# Patient Record
Sex: MALE | Race: WHITE | Employment: UNEMPLOYED | URBAN - METROPOLITAN AREA
[De-identification: names, ages, dates, MRNs, and addresses within clinical notes are randomized per-mention and may not be internally consistent; named-entity substitution may affect disease eponyms.]

---

## 2017-03-20 DIAGNOSIS — F90.0 ATTENTION DEFICIT HYPERACTIVITY DISORDER (ADHD), PREDOMINANTLY INATTENTIVE TYPE: ICD-10-CM

## 2017-03-21 RX ORDER — DEXTROAMPHETAMINE SACCHARATE, AMPHETAMINE ASPARTATE MONOHYDRATE, DEXTROAMPHETAMINE SULFATE AND AMPHETAMINE SULFATE 7.5; 7.5; 7.5; 7.5 MG/1; MG/1; MG/1; MG/1
30 CAPSULE, EXTENDED RELEASE ORAL
Qty: 90 CAP | Refills: 0 | Status: SHIPPED | OUTPATIENT
Start: 2017-03-21 | End: 2017-06-05 | Stop reason: SDUPTHER

## 2017-03-22 ENCOUNTER — HOSPITAL ENCOUNTER (OUTPATIENT)
Dept: GENERAL RADIOLOGY | Age: 22
Discharge: HOME | End: 2017-03-22
Attending: ORTHOPAEDIC SURGERY
Payer: COMMERCIAL

## 2017-03-22 ENCOUNTER — HOSPITAL ENCOUNTER (OUTPATIENT)
Dept: MRI IMAGING | Age: 22
Discharge: HOME | End: 2017-03-22
Attending: ORTHOPAEDIC SURGERY
Payer: COMMERCIAL

## 2017-03-22 VITALS — WEIGHT: 150 LBS | BODY MASS INDEX: 22.15 KG/M2

## 2017-03-22 DIAGNOSIS — S62.025A NONDISPLACED FRACTURE OF MIDDLE THIRD OF NAVICULAR BONE OF LEFT WRIST: ICD-10-CM

## 2017-03-22 PROCEDURE — 74011250636 HC RX REV CODE- 250/636: Performed by: ORTHOPAEDIC SURGERY

## 2017-03-22 PROCEDURE — A9585 GADOBUTROL INJECTION: HCPCS | Performed by: ORTHOPAEDIC SURGERY

## 2017-03-22 PROCEDURE — 25246 INJECTION FOR WRIST X-RAY: CPT

## 2017-03-22 PROCEDURE — 73222 MRI JOINT UPR EXTREM W/DYE: CPT

## 2017-03-22 PROCEDURE — 74011000250 HC RX REV CODE- 250: Performed by: RADIOLOGY

## 2017-03-22 PROCEDURE — 74011636320 HC RX REV CODE- 636/320: Performed by: RADIOLOGY

## 2017-03-22 RX ORDER — LIDOCAINE HYDROCHLORIDE 10 MG/ML
1 INJECTION INFILTRATION; PERINEURAL
Status: COMPLETED | OUTPATIENT
Start: 2017-03-22 | End: 2017-03-22

## 2017-03-22 RX ORDER — SODIUM CHLORIDE 9 MG/ML
3 INJECTION INTRAMUSCULAR; INTRAVENOUS; SUBCUTANEOUS
Status: COMPLETED | OUTPATIENT
Start: 2017-03-22 | End: 2017-03-22

## 2017-03-22 RX ADMIN — SODIUM BICARBONATE 5 ML: 0.2 INJECTION, SOLUTION INTRAVENOUS at 10:34

## 2017-03-22 RX ADMIN — LIDOCAINE HYDROCHLORIDE 1 ML: 10 INJECTION, SOLUTION INFILTRATION; PERINEURAL at 10:34

## 2017-03-22 RX ADMIN — SODIUM CHLORIDE 3 ML: 9 INJECTION INTRAMUSCULAR; INTRAVENOUS; SUBCUTANEOUS at 10:35

## 2017-03-22 RX ADMIN — IOHEXOL 1 ML: 300 INJECTION, SOLUTION INTRAVENOUS at 10:33

## 2017-03-22 RX ADMIN — GADOBUTROL 2 ML: 604.72 INJECTION INTRAVENOUS at 11:11

## 2017-06-05 ENCOUNTER — OFFICE VISIT (OUTPATIENT)
Dept: INTERNAL MEDICINE CLINIC | Age: 22
End: 2017-06-05

## 2017-06-05 VITALS
DIASTOLIC BLOOD PRESSURE: 68 MMHG | BODY MASS INDEX: 23.73 KG/M2 | HEART RATE: 69 BPM | HEIGHT: 69 IN | OXYGEN SATURATION: 97 % | SYSTOLIC BLOOD PRESSURE: 120 MMHG | TEMPERATURE: 98.7 F | WEIGHT: 160.2 LBS | RESPIRATION RATE: 12 BRPM

## 2017-06-05 DIAGNOSIS — F90.0 ATTENTION DEFICIT HYPERACTIVITY DISORDER (ADHD), PREDOMINANTLY INATTENTIVE TYPE: ICD-10-CM

## 2017-06-05 RX ORDER — DEXTROAMPHETAMINE SACCHARATE, AMPHETAMINE ASPARTATE MONOHYDRATE, DEXTROAMPHETAMINE SULFATE AND AMPHETAMINE SULFATE 7.5; 7.5; 7.5; 7.5 MG/1; MG/1; MG/1; MG/1
30 CAPSULE, EXTENDED RELEASE ORAL
Qty: 90 CAP | Refills: 0 | Status: SHIPPED | OUTPATIENT
Start: 2017-06-05 | End: 2017-09-27 | Stop reason: SDUPTHER

## 2017-06-05 NOTE — PROGRESS NOTES
Valeri Clayton 1995, is a 23 y.o. male, who is seen today for reevaluation of ADD. Several weeks ago he tore a ligament in his left wrist and has had a cast since then but expects to have a shorter cast within a couple of days and have the cast off in a few more weeks. He is doing well with Adderall, he is up about 6 pounds from his last visit which may be partially because of the cast.  He does not think he is eating any differently. He sleeps well at night and is able to concentrate with Adderall use. Past Medical History:   Diagnosis Date    ADD (attention deficit hyperactivity disorder, inattentive type)     Heart murmur     as child per pt     Current Outpatient Prescriptions   Medication Sig Dispense Refill    amphetamine-dextroamphetamine XR (ADDERALL XR) 30 mg XR capsule Take 1 Cap (30 mg total) by mouth every morningEarliest Fill Date: 6/5/17. Max Daily Amount: 30 mg 90 Cap 0     Visit Vitals    /68    Pulse 69    Temp 98.7 °F (37.1 °C) (Oral)    Resp 12    Ht 5' 9\" (1.753 m)    Wt 160 lb 3.2 oz (72.7 kg)    SpO2 97%    BMI 23.66 kg/m2     Lungs are clear to percussion. Good breath sounds with no wheezing or crackles. Heart reveals a regular rhythm with normal S1 and S2 no murmur gallop click or rub. Abdomen is soft and nontender with no hepatosplenomegaly or masses. Extremities reveal no clubbing cyanosis or edema. Assessment: #1. He now has a cast for torn ligament, he will follow-up with his orthopedist.  #2.  ADD is doing well, weight is up just a few pounds partly because of his cast.  He will continue healthy diet continue Adderall 30 mg daily on days he needs to concentrate. I have explained to him he does not need to use it every day, just the days he has to concentrate such as work or study. I talked to him about proper use and improper use of the medicine again today.     Follow-up 6 months    This visit lasted 25 minutes, greater than 50% of the time spent counseling as above. Edgar York MD FACP    Please note: This document has been produced using voice recognition software. Unrecognized errors in transcription may be present.

## 2017-09-27 DIAGNOSIS — F90.0 ATTENTION DEFICIT HYPERACTIVITY DISORDER (ADHD), PREDOMINANTLY INATTENTIVE TYPE: ICD-10-CM

## 2017-09-27 RX ORDER — DEXTROAMPHETAMINE SACCHARATE, AMPHETAMINE ASPARTATE MONOHYDRATE, DEXTROAMPHETAMINE SULFATE AND AMPHETAMINE SULFATE 7.5; 7.5; 7.5; 7.5 MG/1; MG/1; MG/1; MG/1
30 CAPSULE, EXTENDED RELEASE ORAL
Qty: 90 CAP | Refills: 0 | Status: SHIPPED | OUTPATIENT
Start: 2017-09-27 | End: 2017-11-17 | Stop reason: SDUPTHER

## 2017-11-15 ENCOUNTER — TELEPHONE (OUTPATIENT)
Dept: INTERNAL MEDICINE CLINIC | Age: 22
End: 2017-11-15

## 2017-11-15 NOTE — TELEPHONE ENCOUNTER
Patient stating his rx for Adderall was lost in the mail. He is currently in [place]. He finally got it but the pharmacy wont fill it because they said it has . He wants to know if you will write another one and he will have his Mother  the script for him.

## 2017-11-16 NOTE — TELEPHONE ENCOUNTER
Pt calling again asking if we can write a new rx for him.  He never filled the one in Sept. Says his mom mailed it to him but it was lost in the mail

## 2017-11-17 DIAGNOSIS — F90.0 ATTENTION DEFICIT HYPERACTIVITY DISORDER (ADHD), PREDOMINANTLY INATTENTIVE TYPE: ICD-10-CM

## 2017-11-17 RX ORDER — DEXTROAMPHETAMINE SACCHARATE, AMPHETAMINE ASPARTATE MONOHYDRATE, DEXTROAMPHETAMINE SULFATE AND AMPHETAMINE SULFATE 7.5; 7.5; 7.5; 7.5 MG/1; MG/1; MG/1; MG/1
30 CAPSULE, EXTENDED RELEASE ORAL
Qty: 90 CAP | Refills: 0 | Status: SHIPPED | OUTPATIENT
Start: 2017-11-17 | End: 2018-03-14 | Stop reason: SDUPTHER

## 2017-11-17 NOTE — TELEPHONE ENCOUNTER
Patient calling again asking for a rx for his Adderall   Patient asking for a script for today   Pls Advise

## 2018-03-06 DIAGNOSIS — F90.0 ATTENTION DEFICIT HYPERACTIVITY DISORDER (ADHD), PREDOMINANTLY INATTENTIVE TYPE: ICD-10-CM

## 2018-03-06 RX ORDER — DEXTROAMPHETAMINE SACCHARATE, AMPHETAMINE ASPARTATE MONOHYDRATE, DEXTROAMPHETAMINE SULFATE AND AMPHETAMINE SULFATE 7.5; 7.5; 7.5; 7.5 MG/1; MG/1; MG/1; MG/1
30 CAPSULE, EXTENDED RELEASE ORAL
Qty: 90 CAP | Refills: 0 | OUTPATIENT
Start: 2018-03-06

## 2018-03-06 NOTE — TELEPHONE ENCOUNTER
Please contact the patient for scheduling per refusal reason below. Thanks.      Refused by: Mariela Nagel MD  Refusal reason: Appt required, please call patient

## 2018-03-06 NOTE — TELEPHONE ENCOUNTER
[facility]/2017 for a 90 d/s. There appears to be no inconsistencies in regards to the prescribing of this medication. Last Visit: 06/05/2017 with MD Ivett Obregon    Next Appointment: noted to f/u in 6 months   Previous Refill Encounters: 11/17/2017 per MD Ivett Obregon #90     Requested Prescriptions     Pending Prescriptions Disp Refills    amphetamine-dextroamphetamine XR (ADDERALL XR) 30 mg XR capsule 90 Cap 0     Sig: Take 1 Cap (30 mg total) by mouth every morningEarliest Fill Date: 3/6/18.   Max Daily Amount: 30 mg

## 2018-03-14 ENCOUNTER — OFFICE VISIT (OUTPATIENT)
Dept: INTERNAL MEDICINE CLINIC | Age: 23
End: 2018-03-14

## 2018-03-14 VITALS
BODY MASS INDEX: 21.77 KG/M2 | WEIGHT: 147 LBS | RESPIRATION RATE: 12 BRPM | HEIGHT: 69 IN | TEMPERATURE: 98.9 F | OXYGEN SATURATION: 98 % | HEART RATE: 65 BPM | SYSTOLIC BLOOD PRESSURE: 120 MMHG | DIASTOLIC BLOOD PRESSURE: 60 MMHG

## 2018-03-14 DIAGNOSIS — F90.0 ATTENTION DEFICIT HYPERACTIVITY DISORDER (ADHD), PREDOMINANTLY INATTENTIVE TYPE: ICD-10-CM

## 2018-03-14 RX ORDER — DEXTROAMPHETAMINE SACCHARATE, AMPHETAMINE ASPARTATE MONOHYDRATE, DEXTROAMPHETAMINE SULFATE AND AMPHETAMINE SULFATE 7.5; 7.5; 7.5; 7.5 MG/1; MG/1; MG/1; MG/1
30 CAPSULE, EXTENDED RELEASE ORAL
Qty: 90 CAP | Refills: 0 | Status: SHIPPED | OUTPATIENT
Start: 2018-03-14 | End: 2018-07-02 | Stop reason: SDUPTHER

## 2018-03-14 NOTE — PROGRESS NOTES
Robert Araiza 1995, is a 23 y.o. male, who is seen today for reevaluation of ADD. He feels well. He has been using Adderall 30 mg daily for quite a while and that works well for him. Without it he can concentrate nearly as well. He had gained quite a bit of weight last summer and is down 13 pounds from June. No longer drinks beer, he says if he has even 1 beer he feels hung over the next day so he stopped drinking altogether. Past Medical History:   Diagnosis Date    ADD (attention deficit hyperactivity disorder, inattentive type)     Heart murmur     as child per pt     Current Outpatient Prescriptions   Medication Sig Dispense Refill    amphetamine-dextroamphetamine XR (ADDERALL XR) 30 mg XR capsule Take 1 Cap (30 mg total) by mouth every morningEarliest Fill Date: 3/14/18. Max Daily Amount: 30 mg 90 Cap 0     Visit Vitals    /60    Pulse 65    Temp 98.9 °F (37.2 °C) (Oral)    Resp 12    Ht 5' 9\" (1.753 m)    Wt 147 lb (66.7 kg)    SpO2 98%    BMI 21.71 kg/m2     Lungs are clear to percussion. Good breath sounds with no wheezing or crackles. Heart reveals a regular rhythm with normal S1 and S2 no murmur gallop click or rub. Neck reveals no adenopathy or thyromegaly. Assessment: ADD doing well. He will continue Adderall XL 30 mg daily which has worked out best for him. Follow-up 6 months or sooner if needed    Edgar Luna MD FACP    Please note: This document has been produced using voice recognition software. Unrecognized errors in transcription may be present.

## 2018-03-15 ENCOUNTER — TELEPHONE (OUTPATIENT)
Dept: INTERNAL MEDICINE CLINIC | Age: 23
End: 2018-03-15

## 2018-03-15 NOTE — TELEPHONE ENCOUNTER
Please contact the patient's plan to initiate a prior authorization for Adderall at [phone].  Patient ID # is [id number]

## 2018-07-02 DIAGNOSIS — F90.0 ATTENTION DEFICIT HYPERACTIVITY DISORDER (ADHD), PREDOMINANTLY INATTENTIVE TYPE: ICD-10-CM

## 2018-07-02 RX ORDER — DEXTROAMPHETAMINE SACCHARATE, AMPHETAMINE ASPARTATE MONOHYDRATE, DEXTROAMPHETAMINE SULFATE AND AMPHETAMINE SULFATE 7.5; 7.5; 7.5; 7.5 MG/1; MG/1; MG/1; MG/1
30 CAPSULE, EXTENDED RELEASE ORAL
Qty: 90 CAP | Refills: 0 | Status: SHIPPED | OUTPATIENT
Start: 2018-07-02 | End: 2018-12-10 | Stop reason: SDUPTHER

## 2018-07-02 NOTE — TELEPHONE ENCOUNTER
[facility] as 03/16/2018. There appears to be no inconsistencies in regards to the prescribing of this medication. Last Visit: 03/14/2018 with MD Lisette Luu    Next Appointment: noted to f/u in 6 months   Previous Refill Encounters: 03/14/2018 per MD Lisette Luu #90     Requested Prescriptions     Pending Prescriptions Disp Refills    amphetamine-dextroamphetamine XR (ADDERALL XR) 30 mg XR capsule 90 Cap 0     Sig: Take 1 Cap (30 mg total) by mouth every morningEarliest Fill Date: 7/2/18.   Max Daily Amount: 30 mg

## 2018-12-10 ENCOUNTER — OFFICE VISIT (OUTPATIENT)
Dept: INTERNAL MEDICINE CLINIC | Age: 23
End: 2018-12-10

## 2018-12-10 VITALS
WEIGHT: 150 LBS | BODY MASS INDEX: 22.22 KG/M2 | HEIGHT: 69 IN | RESPIRATION RATE: 12 BRPM | SYSTOLIC BLOOD PRESSURE: 110 MMHG | DIASTOLIC BLOOD PRESSURE: 68 MMHG | HEART RATE: 74 BPM | TEMPERATURE: 98.5 F | OXYGEN SATURATION: 98 %

## 2018-12-10 DIAGNOSIS — F90.0 ATTENTION DEFICIT HYPERACTIVITY DISORDER (ADHD), PREDOMINANTLY INATTENTIVE TYPE: ICD-10-CM

## 2018-12-10 RX ORDER — DEXTROAMPHETAMINE SACCHARATE, AMPHETAMINE ASPARTATE MONOHYDRATE, DEXTROAMPHETAMINE SULFATE AND AMPHETAMINE SULFATE 7.5; 7.5; 7.5; 7.5 MG/1; MG/1; MG/1; MG/1
30 CAPSULE, EXTENDED RELEASE ORAL
Qty: 90 CAP | Refills: 0 | Status: SHIPPED | OUTPATIENT
Start: 2018-12-10 | End: 2019-03-25 | Stop reason: SDUPTHER

## 2018-12-10 NOTE — PROGRESS NOTES
Ellen Shah 1995, is a 20 y.o. male, who is seen today for reevaluation of ADD. He continues to function well at work, concentrates very well with the help of Adderall and only uses it on days he needs to concentrate. He sleeps well at night and his weight is only 3 pounds different than it was when I last saw him in March. No other new complaints. Past Medical History:  
Diagnosis Date  ADD (attention deficit hyperactivity disorder, inattentive type)  Heart murmur   
 as child per pt Current Outpatient Medications Medication Sig Dispense Refill  amphetamine-dextroamphetamine XR (ADDERALL XR) 30 mg XR capsule Take 1 Cap (30 mg total) by mouth every morningEarliest Fill Date: 12/10/18. Max Daily Amount: 30 mg 90 Cap 0 Visit Vitals /68 Pulse 74 Temp 98.5 °F (36.9 °C) (Oral) Resp 12 Ht 5' 9\" (1.753 m) Wt 150 lb (68 kg) SpO2 98% BMI 22.15 kg/m² Neck reveals no adenopathy or thyromegaly. No JVD or HJR. Lungs are clear to percussion. Good breath sounds with no wheezing or crackles. Heart reveals a regular rhythm with normal S1 and S2 no murmur gallop click or rub. Apical impulse is not palpable. Abdomen is soft and nontender with no hepatosplenomegaly or masses and no bruits. Extremities reveal no clubbing cyanosis or edema. Pulses are 2+. Gait is normal.  Affect is normal.  Skin reveals numerous large tattoos of the arms and trunk. Assessment: ADD, functioning well with no apparent side effects. He will continue Adderall XR 30 mg daily on days he needs to concentrate and he will not use it when he does not need to concentrate. He will call me if side effects develop such as substantial weight change, tremulousness or other side effects. I have explained to him that he needs to come in every 6 months while on this schedule II drug, not 8 or 9 months. He agrees to this.   #2.  History of childhood murmur, I have not heard a murmur, I will take this off his problem list. 
 
Follow-up 6 months or sooner if needed, but definitely not beyond 6 months if he is going to stay on Adderall. This visit lasted 25 minutes, greater than 50% of the time was spent counseling as above. Edgar Purcell, [address] Please note: This document has been produced using voice recognition software. Unrecognized errors in transcription may be present.

## 2019-03-25 DIAGNOSIS — F90.0 ATTENTION DEFICIT HYPERACTIVITY DISORDER (ADHD), PREDOMINANTLY INATTENTIVE TYPE: ICD-10-CM

## 2019-03-25 RX ORDER — DEXTROAMPHETAMINE SACCHARATE, AMPHETAMINE ASPARTATE MONOHYDRATE, DEXTROAMPHETAMINE SULFATE AND AMPHETAMINE SULFATE 7.5; 7.5; 7.5; 7.5 MG/1; MG/1; MG/1; MG/1
30 CAPSULE, EXTENDED RELEASE ORAL
Qty: 90 CAP | Refills: 0 | Status: SHIPPED | OUTPATIENT
Start: 2019-03-25 | End: 2019-09-10 | Stop reason: SDUPTHER

## 2019-03-25 NOTE — TELEPHONE ENCOUNTER
[facility]der[facility] XR as 8-3-18 for a 90 d/s. No info on  regarding the December 2018 Rx. Last Visit: 12-10-18 with MD Rukhsana Thompson  Next Appointment: advised to return in 6 months  Previous Refill Encounter(s): 12-10-18 #90    Requested Prescriptions     Pending Prescriptions Disp Refills    amphetamine-dextroamphetamine XR (ADDERALL XR) 30 mg XR capsule 90 Cap 0     Sig: Take 1 Cap by mouth every morning. Max Daily Amount: 30 mg.

## 2019-04-12 ENCOUNTER — RECORD ABSTRACTING (OUTPATIENT)
Age: 24
End: 2019-04-12

## 2019-04-12 DIAGNOSIS — Z78.9 OTHER SPECIFIED HEALTH STATUS: ICD-10-CM

## 2019-04-12 DIAGNOSIS — M23.91 UNSPECIFIED INTERNAL DERANGEMENT OF RIGHT KNEE: ICD-10-CM

## 2019-04-12 PROBLEM — Z00.00 ENCOUNTER FOR PREVENTIVE HEALTH EXAMINATION: Status: ACTIVE | Noted: 2019-04-12

## 2019-04-12 RX ORDER — NAPROXEN 500 MG/1
500 TABLET ORAL
Refills: 0 | Status: ACTIVE | COMMUNITY

## 2019-04-17 ENCOUNTER — APPOINTMENT (OUTPATIENT)
Dept: ORTHOPEDIC SURGERY | Facility: CLINIC | Age: 24
End: 2019-04-17

## 2019-04-22 ENCOUNTER — TELEPHONE (OUTPATIENT)
Dept: INTERNAL MEDICINE CLINIC | Age: 24
End: 2019-04-22

## 2019-09-10 ENCOUNTER — TELEPHONE (OUTPATIENT)
Dept: INTERNAL MEDICINE CLINIC | Age: 24
End: 2019-09-10

## 2019-09-10 ENCOUNTER — OFFICE VISIT (OUTPATIENT)
Dept: INTERNAL MEDICINE CLINIC | Age: 24
End: 2019-09-10

## 2019-09-10 VITALS
TEMPERATURE: 98.7 F | OXYGEN SATURATION: 98 % | WEIGHT: 155 LBS | BODY MASS INDEX: 22.96 KG/M2 | RESPIRATION RATE: 12 BRPM | SYSTOLIC BLOOD PRESSURE: 126 MMHG | DIASTOLIC BLOOD PRESSURE: 70 MMHG | HEART RATE: 72 BPM | HEIGHT: 69 IN

## 2019-09-10 DIAGNOSIS — F90.0 ATTENTION DEFICIT HYPERACTIVITY DISORDER (ADHD), PREDOMINANTLY INATTENTIVE TYPE: ICD-10-CM

## 2019-09-10 RX ORDER — DEXTROAMPHETAMINE SACCHARATE, AMPHETAMINE ASPARTATE MONOHYDRATE, DEXTROAMPHETAMINE SULFATE AND AMPHETAMINE SULFATE 7.5; 7.5; 7.5; 7.5 MG/1; MG/1; MG/1; MG/1
30 CAPSULE, EXTENDED RELEASE ORAL
Qty: 90 CAP | Refills: 0 | Status: SHIPPED | OUTPATIENT
Start: 2019-09-10 | End: 2019-10-23 | Stop reason: SDUPTHER

## 2019-09-10 NOTE — PROGRESS NOTES
Brook Okeefe 1995, is a 30 y.o. male, who is seen today for reevaluation of ADD but also concerned about erectile dysfunction. He has been dating the same girl for about 3 years and then broke up but now when he has been out with a couple of other girls he has not been able to get an adequate erection. He thinks it may be just in his head. He is functioning well with Adderall. He is a little late with follow-up because he had an insurance issue but now is back with his insurance company. Past Medical History:   Diagnosis Date    ADD (attention deficit hyperactivity disorder, inattentive type)      Current Outpatient Medications   Medication Sig Dispense Refill    amphetamine-dextroamphetamine XR (ADDERALL XR) 30 mg XR capsule Take 1 Cap by mouth every morning. Max Daily Amount: 30 mg. 90 Cap 0     Visit Vitals  /70 (BP 1 Location: Left arm, BP Patient Position: Sitting)   Pulse 72   Temp 98.7 °F (37.1 °C) (Oral)   Resp 12   Ht 5' 9\" (1.753 m)   Wt 155 lb (70.3 kg)   SpO2 98%   BMI 22.89 kg/m²     Carotids are 2+ without bruits. No adenopathy or thyromegaly. Lungs are clear to percussion. Good breath sounds with no wheezing or crackles. Heart reveals a regular rhythm with normal S1 and S2 no murmur gallop click or rub. Abdomen is soft and nontender with no hepatosplenomegaly or masses and no bruits. Extremities reveal no clubbing cyanosis or edema. Pulses are 2+. Assessment: #1. ADD doing well, he will continue Adderall XR 30 mg daily. #2.  Recent problems with erectile dysfunction with change in sexual partners. Nothing obvious from a physical standpoint, this should clear up on its own in this 73-year-old healthy male. I would send him to a urologist if it does not get better. Follow-up in 6 months    Edgar Crouch MD FACP    Please note: This document has been produced using voice recognition software. Unrecognized errors in transcription may be present.

## 2019-09-10 NOTE — TELEPHONE ENCOUNTER
Pt calling, says he was here today and got an rx for adderall 90 days. Says his pharmacy can only fill for 30 days per his insurance company. He just wanted RM to know so when he requests again in 30 days he won't be told too soon.

## 2019-10-23 DIAGNOSIS — F90.0 ATTENTION DEFICIT HYPERACTIVITY DISORDER (ADHD), PREDOMINANTLY INATTENTIVE TYPE: ICD-10-CM

## 2019-10-23 RX ORDER — DEXTROAMPHETAMINE SACCHARATE, AMPHETAMINE ASPARTATE MONOHYDRATE, DEXTROAMPHETAMINE SULFATE AND AMPHETAMINE SULFATE 7.5; 7.5; 7.5; 7.5 MG/1; MG/1; MG/1; MG/1
30 CAPSULE, EXTENDED RELEASE ORAL
Qty: 30 CAP | Refills: 0 | Status: SHIPPED | OUTPATIENT
Start: 2019-10-23 | End: 2019-11-12 | Stop reason: SDUPTHER

## 2019-10-23 NOTE — TELEPHONE ENCOUNTER
Last Visit: 9/10/19 with MD Rebeca Schmitz  Next Appointment: return in 6 months  Previous Refill Encounter(s): 9/10/19 #90 (pharmacy only filled #30 due to insurance limits)    Requested Prescriptions     Pending Prescriptions Disp Refills    amphetamine-dextroamphetamine XR (ADDERALL XR) 30 mg XR capsule 30 Cap 0     Sig: Take 1 Cap by mouth every morning. Max Daily Amount: 30 mg.

## 2019-11-12 ENCOUNTER — OFFICE VISIT (OUTPATIENT)
Dept: INTERNAL MEDICINE CLINIC | Age: 24
End: 2019-11-12

## 2019-11-12 VITALS
WEIGHT: 155.6 LBS | HEART RATE: 74 BPM | OXYGEN SATURATION: 98 % | BODY MASS INDEX: 23.05 KG/M2 | TEMPERATURE: 98.9 F | SYSTOLIC BLOOD PRESSURE: 118 MMHG | HEIGHT: 69 IN | DIASTOLIC BLOOD PRESSURE: 80 MMHG | RESPIRATION RATE: 12 BRPM

## 2019-11-12 DIAGNOSIS — N52.8 OTHER MALE ERECTILE DYSFUNCTION: Primary | ICD-10-CM

## 2019-11-12 DIAGNOSIS — F90.0 ATTENTION DEFICIT HYPERACTIVITY DISORDER (ADHD), PREDOMINANTLY INATTENTIVE TYPE: ICD-10-CM

## 2019-11-12 RX ORDER — DEXTROAMPHETAMINE SACCHARATE, AMPHETAMINE ASPARTATE MONOHYDRATE, DEXTROAMPHETAMINE SULFATE AND AMPHETAMINE SULFATE 7.5; 7.5; 7.5; 7.5 MG/1; MG/1; MG/1; MG/1
30 CAPSULE, EXTENDED RELEASE ORAL
Qty: 30 CAP | Refills: 0 | Status: SHIPPED | OUTPATIENT
Start: 2019-11-12 | End: 2020-03-24 | Stop reason: SDUPTHER

## 2019-11-12 RX ORDER — SILDENAFIL 50 MG/1
TABLET, FILM COATED ORAL
Qty: 5 TAB | Refills: 1 | Status: SHIPPED | OUTPATIENT
Start: 2019-11-12

## 2019-11-12 RX ORDER — SILDENAFIL 50 MG/1
TABLET, FILM COATED ORAL
Qty: 5 TAB | Refills: 1 | Status: SHIPPED | OUTPATIENT
Start: 2019-11-12 | End: 2019-11-12 | Stop reason: SDUPTHER

## 2019-11-12 NOTE — PROGRESS NOTES
Rajni Boykin 1995, is a 30 y.o. male, who is seen today for concerns about erectile dysfunction and also tingling in his right hand periodically. For about 3 months he has had problems obtaining an adequate erection and an erection the last long enough for intercourse. He had a regular relationship until a little over 3 months ago and no problems during that time. He has dated a number of lady since then and that is when the problem developed. He also notes that he has had some tingling intermittently if he strains his right wrist, previous injury on that side. He has ADD and does need a refill on Adderall, he uses that appropriately, 30 mg a day and it helps him greatly. He has not noticed any side effects from that medicine, sleeps pretty well at night etc.    Past Medical History:   Diagnosis Date    ADD (attention deficit hyperactivity disorder, inattentive type)      Current Outpatient Medications   Medication Sig Dispense Refill    amphetamine-dextroamphetamine XR (ADDERALL XR) 30 mg XR capsule Take 1 Cap by mouth every morning. Max Daily Amount: 30 mg. 30 Cap 0     Visit Vitals  /80 (BP 1 Location: Left arm, BP Patient Position: Sitting)   Pulse 74   Temp 98.9 °F (37.2 °C)   Resp 12   Ht 5' 9\" (1.753 m)   Wt 155 lb 9.6 oz (70.6 kg)   SpO2 98%   BMI 22.98 kg/m²     Excellent  strength bilaterally. Radial arteries are 2+ bilaterally. Tinel sign is absent on the right side. He is circumcised and penis appears normal, normal pubic hair pattern, normal feeling testicles consistency and size. Assessment: #1. Erectile dysfunction almost certainly psychogenic. We will check total testosterone for him today but I anticipate this will be normal since he has normal feeling testicles and hair growth pattern.   Regardless of the results we will try him on sildenafil 50 mg at least 1 hour before intercourse for up to 5 doses and then dissipate that if he is successful with intercourse with the sildenafil probably thereafter he will be successful. #2. Intermittent tingling in the right hand from previous injury but no evidence of carpal tunnel syndrome, no symptoms at night. #3.  ADD has been doing quite well on Adderall, I do not think that has anything to do with any of his other symptoms. He will continue Adderall at 30 mg daily. Follow-up as previously planned for follow-up of ADD. Edgar Davies MD FACP    Please note: This document has been produced using voice recognition software. Unrecognized errors in transcription may be present. This visit lasted 25 minutes, greater than 50% of the time spent counseling. Edgar Davies MD FACP    Please note: This document has been produced using voice recognition software. Unrecognized errors in transcription may be present.

## 2019-11-13 ENCOUNTER — TELEPHONE (OUTPATIENT)
Dept: INTERNAL MEDICINE CLINIC | Age: 24
End: 2019-11-13

## 2019-11-13 ENCOUNTER — DOCUMENTATION ONLY (OUTPATIENT)
Dept: INTERNAL MEDICINE CLINIC | Age: 24
End: 2019-11-13

## 2019-11-13 DIAGNOSIS — Z51.81 MEDICATION MONITORING ENCOUNTER: Primary | ICD-10-CM

## 2019-11-13 DIAGNOSIS — Z51.81 MEDICATION MONITORING ENCOUNTER: ICD-10-CM

## 2019-11-13 LAB — TESTOST SERPL-MCNC: 404 NG/DL (ref 264–916)

## 2019-11-13 NOTE — TELEPHONE ENCOUNTER
[place] Page: Raudel CARPENTER Case ID: [id number] - Rx #: [id number]   Outcome Approvedtoday   Questionnaire submitted. PA Case [id number]   Status: Approved. Authorization and Notifications Completed. DrugAmphetamine-Dextroamphet ER 30MG er capsules      Patient notified, that he must provide a UDS for future approvals, or refills.

## 2019-11-13 NOTE — TELEPHONE ENCOUNTER
----- Message from Zoe Grimm MD sent at 11/13/2019  8:58 AM EST -----  Please notify the patient that his testosterone level is normal

## 2019-11-13 NOTE — TELEPHONE ENCOUNTER
Pt checking on prior auth for adderall.  Says his pharmacy says they sent request.    Wants call today with update please

## 2019-11-16 LAB
AMPHETAMINES UR QL SCN: NEGATIVE NG/ML
BARBITURATES UR QL SCN: NEGATIVE NG/ML
BENZODIAZ UR QL SCN: NEGATIVE NG/ML
BZE UR QL SCN: NEGATIVE NG/ML
CANNABINOIDS UR QL SCN: NEGATIVE NG/ML
CREAT UR-MCNC: 10.8 MG/DL (ref 20–300)
FENTANYL+NORFENTANYL UR QL SCN: NEGATIVE PG/ML
MEPERIDINE UR QL: NEGATIVE NG/ML
METHADONE UR QL SCN: NEGATIVE NG/ML
OPIATES UR QL SCN: NEGATIVE NG/ML
OXYCODONE+OXYMORPHONE UR QL SCN: NEGATIVE NG/ML
PCP UR QL: NEGATIVE NG/ML
PH UR: 6.9 [PH] (ref 4.5–8.9)
PLEASE NOTE:, 733157: ABNORMAL
PROPOXYPH UR QL SCN: NEGATIVE NG/ML
SP GR UR: 1
TRAMADOL UR QL SCN: NEGATIVE NG/ML

## 2019-11-22 ENCOUNTER — TELEPHONE (OUTPATIENT)
Dept: INTERNAL MEDICINE CLINIC | Age: 24
End: 2019-11-22

## 2019-11-22 NOTE — PROGRESS NOTES
Please notify the patient that his recent urine drug screen contained mostly water and very little urine. I am no longer able to prescribe any controlled substances to him, I recommend seeing a psychiatrist if he requires treatment for ADD.

## 2019-11-22 NOTE — TELEPHONE ENCOUNTER
----- Message from Nancy Barrios MD sent at 11/22/2019  1:11 PM EST -----  Please notify the patient that his recent urine drug screen contained mostly water and very little urine. I am no longer able to prescribe any controlled substances to him, I recommend seeing a psychiatrist if he requires treatment for ADD.

## 2019-11-22 NOTE — TELEPHONE ENCOUNTER
Pt stated this is impossible he did not put water in urine, pt stated he drunk a bunch of water prior to test so that he could urinate.

## 2020-01-28 ENCOUNTER — HOSPITAL ENCOUNTER (OUTPATIENT)
Dept: MRI IMAGING | Age: 25
Discharge: HOME | End: 2020-01-28
Attending: SURGERY
Payer: MEDICAID

## 2020-01-28 DIAGNOSIS — S63.519A: ICD-10-CM

## 2020-01-28 PROCEDURE — 73221 MRI JOINT UPR EXTREM W/O DYE: CPT

## 2020-02-11 DIAGNOSIS — F90.0 ATTENTION DEFICIT HYPERACTIVITY DISORDER (ADHD), PREDOMINANTLY INATTENTIVE TYPE: ICD-10-CM

## 2020-02-11 RX ORDER — DEXTROAMPHETAMINE SACCHARATE, AMPHETAMINE ASPARTATE MONOHYDRATE, DEXTROAMPHETAMINE SULFATE AND AMPHETAMINE SULFATE 7.5; 7.5; 7.5; 7.5 MG/1; MG/1; MG/1; MG/1
30 CAPSULE, EXTENDED RELEASE ORAL
Qty: 30 CAP | Refills: 0 | OUTPATIENT
Start: 2020-02-11

## 2020-02-11 NOTE — TELEPHONE ENCOUNTER
UDS done 11/14/19    Last Visit: 11/12/19 with MD Jhoana Stroud  Next Appointment: none  Previous Refill Encounter(s): 11/12/19 #30    Requested Prescriptions     Pending Prescriptions Disp Refills    amphetamine-dextroamphetamine XR (ADDERALL XR) 30 mg XR capsule 30 Cap 0     Sig: Take 1 Cap by mouth every morning. Max Daily Amount: 30 mg.

## 2020-02-12 NOTE — TELEPHONE ENCOUNTER
Pt advised RM will not refill due to last drug test.    He said he had asked nurse to request one additional drug test because he says he did not add any water to the test. He wants to know if RM will consider one additional test?

## 2020-03-24 ENCOUNTER — OFFICE VISIT (OUTPATIENT)
Dept: INTERNAL MEDICINE CLINIC | Age: 25
End: 2020-03-24

## 2020-03-24 VITALS
RESPIRATION RATE: 16 BRPM | HEIGHT: 69 IN | DIASTOLIC BLOOD PRESSURE: 66 MMHG | WEIGHT: 154.9 LBS | TEMPERATURE: 98.6 F | OXYGEN SATURATION: 100 % | SYSTOLIC BLOOD PRESSURE: 129 MMHG | BODY MASS INDEX: 22.94 KG/M2 | HEART RATE: 66 BPM

## 2020-03-24 DIAGNOSIS — Z00.00 PHYSICAL EXAM: Primary | ICD-10-CM

## 2020-03-24 DIAGNOSIS — F90.0 ATTENTION DEFICIT HYPERACTIVITY DISORDER (ADHD), PREDOMINANTLY INATTENTIVE TYPE: ICD-10-CM

## 2020-03-24 DIAGNOSIS — L03.116 CELLULITIS OF LEFT LOWER EXTREMITY: ICD-10-CM

## 2020-03-24 RX ORDER — CEPHALEXIN 500 MG/1
500 CAPSULE ORAL 3 TIMES DAILY
Qty: 21 CAP | Refills: 0 | Status: SHIPPED | OUTPATIENT
Start: 2020-03-24 | End: 2020-04-03

## 2020-03-24 RX ORDER — DEXTROAMPHETAMINE SACCHARATE, AMPHETAMINE ASPARTATE MONOHYDRATE, DEXTROAMPHETAMINE SULFATE AND AMPHETAMINE SULFATE 7.5; 7.5; 7.5; 7.5 MG/1; MG/1; MG/1; MG/1
30 CAPSULE, EXTENDED RELEASE ORAL
Qty: 26 CAP | Refills: 0 | Status: SHIPPED | OUTPATIENT
Start: 2020-03-24 | End: 2020-04-23 | Stop reason: SDUPTHER

## 2020-03-24 RX ORDER — DEXTROAMPHETAMINE SACCHARATE, AMPHETAMINE ASPARTATE MONOHYDRATE, DEXTROAMPHETAMINE SULFATE AND AMPHETAMINE SULFATE 7.5; 7.5; 7.5; 7.5 MG/1; MG/1; MG/1; MG/1
30 CAPSULE, EXTENDED RELEASE ORAL
Qty: 30 CAP | Refills: 0 | Status: SHIPPED | OUTPATIENT
Start: 2020-03-24 | End: 2020-03-24 | Stop reason: SDUPTHER

## 2020-03-24 NOTE — PROGRESS NOTES
[address] [place] [address] and Primary Care  [place] [address]  [address] [place] [address]  Phone:  [phone]  Fax: [phone]       Chief Complaint   Patient presents with    New Patient     establish care; history of taking Adderall   . SUBJECTIVE:    Emery Seymour is a 30 y.o. male Comes in as a new patient. He has a history of attention deficit disorder and has been on medication since the age of 10. He wants to continue this. He has seen a physician in the [facility] area. This is well documented. He has also noted redness and slight swelling of the distal left leg after receiving a tattoo. Current Outpatient Medications   Medication Sig Dispense Refill    cephALEXin (KEFLEX) 500 mg capsule Take 1 Cap by mouth three (3) times daily for 10 days. 21 Cap 0    amphetamine-dextroamphetamine XR (ADDERALL XR) 30 mg XR capsule Take 1 Cap by mouth every morning.  Max Daily Amount: 30 mg. 26 Cap 0    sildenafil citrate (VIAGRA) 50 mg tablet Take 1 tablet by mouth at least 1 hour before intercourse 5 Tab 1     Past Medical History:   Diagnosis Date    ADD (attention deficit hyperactivity disorder, inattentive type)     ADHD      Past Surgical History:   Procedure Laterality Date    HX WISDOM TEETH EXTRACTION       No Known Allergies      REVIEW OF SYSTEMS:  General: negative for - chills or fever  ENT: negative for - headaches, nasal congestion or tinnitus  Respiratory: negative for - cough, hemoptysis, shortness of breath or wheezing  Cardiovascular : negative for - chest pain, edema, palpitations or shortness of breath  Gastrointestinal: negative for - abdominal pain, blood in stools, heartburn or nausea/vomiting  Genito-Urinary: no dysuria, trouble voiding, or hematuria  Musculoskeletal: negative for - gait disturbance, joint pain, joint stiffness or joint swelling  Neurological: no TIA or stroke symptoms  Hematologic: no bruises, no bleeding, no swollen glands  Integument: no lumps, mole changes, nail changes or rash  Endocrine: no malaise/lethargy or unexpected weight changes      Social History     Socioeconomic History    Marital status: SINGLE     Spouse name: Not on file    Number of children: Not on file    Years of education: Not on file    Highest education level: Not on file   Tobacco Use    Smoking status: Former Smoker     Packs/day: 0.30     Years: 2.00     Pack years: 0.60     Types: Cigarettes     Last attempt to quit: 2014     Years since quittin.8    Smokeless tobacco: Never Used   Substance and Sexual Activity    Alcohol use: No     Comment: occasionally     Drug use: No    Sexual activity: Yes     Partners: Female     Birth control/protection: Condom     Family History   Problem Relation Age of Onset    Other Mother         ida    Asthma Brother        OBJECTIVE:    Visit Vitals  /66   Pulse 66   Temp 98.6 °F (37 °C) (Oral)   Resp 16   Ht 5' 9\" (1.753 m)   Wt 154 lb 14.4 oz (70.3 kg)   SpO2 100%   BMI 22.87 kg/m²     CONSTITUTIONAL: well , well nourished, appears age appropriate  EYES: perrla, eom intact  ENMT:moist mucous membranes, pharynx clear  NECK: supple. Thyroid normal  RESPIRATORY: Chest: clear to ascultation and percussion   CARDIOVASCULAR: Heart: regular rate and rhythm  GASTROINTESTINAL: Abdomen: soft, bowel sounds active  HEMATOLOGIC: no pathological lymph nodes palpated  MUSCULOSKELETAL: Extremities: no edema, pulse 1+   INTEGUMENT: No unusual rashes or suspicious skin lesions noted. Nails appear normal.  NEUROLOGIC: non-focal exam   MENTAL STATUS: alert and oriented, appropriate affect      ASSESSMENT:  1. Physical exam    2. Attention deficit hyperactivity disorder (ADHD), predominantly inattentive type    3. Cellulitis of left lower extremity        PLAN:    1. The patient will be given medication for his ADHD, 26 tablets. Drug screen will also be obtained.   2. He has a cellulitis of his left leg and he will be given an antibiotic. This should resolve in the next several days. .  Orders Placed This Encounter    CBC WITH AUTOMATED DIFF    LIPID PANEL    METABOLIC PANEL, COMPREHENSIVE    URINALYSIS W/ RFLX MICROSCOPIC    5-DRUG SCREEN, UR    DISCONTD: amphetamine-dextroamphetamine XR (ADDERALL XR) 30 mg XR capsule    cephALEXin (KEFLEX) 500 mg capsule    amphetamine-dextroamphetamine XR (ADDERALL XR) 30 mg XR capsule         Follow-up and Dispositions    · Return in about 3 months (around 6/24/2020).            Brianna Brown MD

## 2020-03-24 NOTE — PROGRESS NOTES
Chief Complaint   Patient presents with    New Patient     establish care; history of taking Adderall     1. Have you been to the ER, urgent care clinic since your last visit? Hospitalized since your last visit? No    2. Have you seen or consulted any other health care providers outside of the [address] [place] [address] since your last visit? Include any pap smears or colon screening.  No

## 2020-03-25 LAB
ALBUMIN SERPL-MCNC: 5.1 G/DL (ref 4.1–5.2)
ALBUMIN/GLOB SERPL: 2.1 {RATIO} (ref 1.2–2.2)
ALP SERPL-CCNC: 94 IU/L (ref 39–117)
ALT SERPL-CCNC: 17 IU/L (ref 0–44)
AMPHETAMINES UR QL SCN: NEGATIVE NG/ML
APPEARANCE UR: CLEAR
AST SERPL-CCNC: 23 IU/L (ref 0–40)
BASOPHILS # BLD AUTO: 0 X10E3/UL (ref 0–0.2)
BASOPHILS NFR BLD AUTO: 1 %
BILIRUB SERPL-MCNC: 0.5 MG/DL (ref 0–1.2)
BILIRUB UR QL STRIP: NEGATIVE
BUN SERPL-MCNC: 11 MG/DL (ref 6–20)
BUN/CREAT SERPL: 11 (ref 9–20)
BZE UR QL: NEGATIVE NG/ML
CALCIUM SERPL-MCNC: 10.2 MG/DL (ref 8.7–10.2)
CANNABINOIDS UR QL SCN: NEGATIVE NG/ML
CHLORIDE SERPL-SCNC: 100 MMOL/L (ref 96–106)
CHOLEST SERPL-MCNC: 119 MG/DL (ref 100–199)
CO2 SERPL-SCNC: 24 MMOL/L (ref 20–29)
COLOR UR: YELLOW
CREAT SERPL-MCNC: 0.98 MG/DL (ref 0.76–1.27)
DRUG SCREEN COMMENT:, 753798: NORMAL
EOSINOPHIL # BLD AUTO: 0 X10E3/UL (ref 0–0.4)
EOSINOPHIL NFR BLD AUTO: 1 %
ERYTHROCYTE [DISTWIDTH] IN BLOOD BY AUTOMATED COUNT: 12.7 % (ref 11.6–15.4)
GLOBULIN SER CALC-MCNC: 2.4 G/DL (ref 1.5–4.5)
GLUCOSE SERPL-MCNC: 87 MG/DL (ref 65–99)
GLUCOSE UR QL: NEGATIVE
HCT VFR BLD AUTO: 42.9 % (ref 37.5–51)
HDLC SERPL-MCNC: 53 MG/DL
HGB BLD-MCNC: 14.7 G/DL (ref 13–17.7)
HGB UR QL STRIP: NEGATIVE
IMM GRANULOCYTES # BLD AUTO: 0 X10E3/UL (ref 0–0.1)
IMM GRANULOCYTES NFR BLD AUTO: 0 %
KETONES UR QL STRIP: NEGATIVE
LDLC SERPL CALC-MCNC: 58 MG/DL (ref 0–99)
LEUKOCYTE ESTERASE UR QL STRIP: NEGATIVE
LYMPHOCYTES # BLD AUTO: 0.9 X10E3/UL (ref 0.7–3.1)
LYMPHOCYTES NFR BLD AUTO: 20 %
MCH RBC QN AUTO: 30.9 PG (ref 26.6–33)
MCHC RBC AUTO-ENTMCNC: 34.3 G/DL (ref 31.5–35.7)
MCV RBC AUTO: 90 FL (ref 79–97)
MICRO URNS: ABNORMAL
MONOCYTES # BLD AUTO: 0.4 X10E3/UL (ref 0.1–0.9)
MONOCYTES NFR BLD AUTO: 9 %
NEUTROPHILS # BLD AUTO: 3 X10E3/UL (ref 1.4–7)
NEUTROPHILS NFR BLD AUTO: 69 %
NITRITE UR QL STRIP: NEGATIVE
OPIATES UR QL SCN: NEGATIVE NG/ML
PCP UR QL: NEGATIVE NG/ML
PH UR STRIP: 8 [PH] (ref 5–7.5)
PLATELET # BLD AUTO: 290 X10E3/UL (ref 150–450)
POTASSIUM SERPL-SCNC: 4.7 MMOL/L (ref 3.5–5.2)
PROT SERPL-MCNC: 7.5 G/DL (ref 6–8.5)
PROT UR QL STRIP: NEGATIVE
RBC # BLD AUTO: 4.75 X10E6/UL (ref 4.14–5.8)
SODIUM SERPL-SCNC: 141 MMOL/L (ref 134–144)
SP GR UR: 1 (ref 1–1.03)
TRIGL SERPL-MCNC: 41 MG/DL (ref 0–149)
UROBILINOGEN UR STRIP-MCNC: 0.2 MG/DL (ref 0.2–1)
VLDLC SERPL CALC-MCNC: 8 MG/DL (ref 5–40)
WBC # BLD AUTO: 4.3 X10E3/UL (ref 3.4–10.8)

## 2020-04-23 DIAGNOSIS — F90.0 ATTENTION DEFICIT HYPERACTIVITY DISORDER (ADHD), PREDOMINANTLY INATTENTIVE TYPE: ICD-10-CM

## 2020-04-23 RX ORDER — DEXTROAMPHETAMINE SACCHARATE, AMPHETAMINE ASPARTATE MONOHYDRATE, DEXTROAMPHETAMINE SULFATE AND AMPHETAMINE SULFATE 7.5; 7.5; 7.5; 7.5 MG/1; MG/1; MG/1; MG/1
30 CAPSULE, EXTENDED RELEASE ORAL
Qty: 26 CAP | Refills: 0 | Status: SHIPPED | OUTPATIENT
Start: 2020-04-23 | End: 2020-05-27 | Stop reason: SDUPTHER

## 2020-06-26 ENCOUNTER — OFFICE VISIT (OUTPATIENT)
Dept: INTERNAL MEDICINE CLINIC | Age: 25
End: 2020-06-26

## 2020-06-26 VITALS
HEIGHT: 69 IN | OXYGEN SATURATION: 96 % | BODY MASS INDEX: 22.05 KG/M2 | SYSTOLIC BLOOD PRESSURE: 106 MMHG | WEIGHT: 148.9 LBS | RESPIRATION RATE: 20 BRPM | HEART RATE: 64 BPM | TEMPERATURE: 98.2 F | DIASTOLIC BLOOD PRESSURE: 65 MMHG

## 2020-06-26 DIAGNOSIS — F90.0 ATTENTION DEFICIT HYPERACTIVITY DISORDER (ADHD), PREDOMINANTLY INATTENTIVE TYPE: ICD-10-CM

## 2020-06-26 RX ORDER — DEXTROAMPHETAMINE SACCHARATE, AMPHETAMINE ASPARTATE MONOHYDRATE, DEXTROAMPHETAMINE SULFATE AND AMPHETAMINE SULFATE 7.5; 7.5; 7.5; 7.5 MG/1; MG/1; MG/1; MG/1
30 CAPSULE, EXTENDED RELEASE ORAL
Qty: 26 CAP | Refills: 0 | Status: SHIPPED | OUTPATIENT
Start: 2020-06-26 | End: 2020-08-04 | Stop reason: SDUPTHER

## 2020-06-26 NOTE — PROGRESS NOTES
Chief Complaint   Patient presents with    Behavioral Problem     3 month follow up      1. Have you been to the ER, urgent care clinic since your last visit? Hospitalized since your last visit? No    2. Have you seen or consulted any other health care providers outside of the [address] [place] [address] since your last visit? Include any pap smears or colon screening.  No

## 2020-06-26 NOTE — PROGRESS NOTES
[facility]  [address] [place] [address]  Phone:  [phone]  Fax: [phone]           Chief Complaint   Patient presents with    Behavioral Problem     3 month follow up    . SUBJECTIVE:    Vega Leonardo is a 30 y.o. male Comes in for follow up of his ADHD. He has done quite well. He is working on a fairly regular basis. I commented to him that he needs to take the medication only when he is in a structured environment and not just randomly if he is not working or in school. He allegedly realizes this, but whether or not he is doing it I am not entirely sure. Current Outpatient Medications   Medication Sig Dispense Refill    amphetamine-dextroamphetamine XR (ADDERALL XR) 30 mg XR capsule Take 1 Cap by mouth every morning. Max Daily Amount: 30 mg. 26 Cap 0    sildenafil citrate (VIAGRA) 50 mg tablet Take 1 tablet by mouth at least 1 hour before intercourse 5 Tab 1     Past Medical History:   Diagnosis Date    ADD (attention deficit hyperactivity disorder, inattentive type)     ADHD      Past Surgical History:   Procedure Laterality Date    HX WISDOM TEETH EXTRACTION       No Known Allergies      OBJECTIVE:  Visit Vitals  /65   Pulse 64   Temp 98.2 °F (36.8 °C) (Oral)   Resp 20   Ht 5' 9\" (1.753 m)   Wt 148 lb 14.4 oz (67.5 kg)   SpO2 96%   BMI 21.99 kg/m²     ENT: perrla,  eom intact  NECK: supple. Thyroid normal  CHEST: clear to ascultation and percussion   HEART: regular rate and rhythm    Assessment:  1. Attention deficit hyperactivity disorder (ADHD), predominantly inattentive type        Plan:    1. The patient will obtain a refill on his Adderall. Urine for drug screen will also be obtained. I again remind him of the importance of taking the medication only in a structured environment.     .  Orders Placed This Encounter    5-DRUG SCREEN, UR    amphetamine-dextroamphetamine XR (ADDERALL XR) 30 mg XR capsule       Follow-up and Dispositions    · Return in about 6 months (around 12/26/2020).            Derick Jurado MD

## 2020-06-27 LAB
AMPHETAMINES UR QL SCN: NEGATIVE NG/ML
BZE UR QL: NEGATIVE NG/ML
CANNABINOIDS UR QL SCN: NEGATIVE NG/ML
DRUG SCREEN COMMENT:, 753798: NORMAL
OPIATES UR QL SCN: NEGATIVE NG/ML
PCP UR QL: NEGATIVE NG/ML

## 2020-08-04 DIAGNOSIS — F90.0 ATTENTION DEFICIT HYPERACTIVITY DISORDER (ADHD), PREDOMINANTLY INATTENTIVE TYPE: ICD-10-CM

## 2020-08-04 RX ORDER — DEXTROAMPHETAMINE SACCHARATE, AMPHETAMINE ASPARTATE MONOHYDRATE, DEXTROAMPHETAMINE SULFATE AND AMPHETAMINE SULFATE 7.5; 7.5; 7.5; 7.5 MG/1; MG/1; MG/1; MG/1
30 CAPSULE, EXTENDED RELEASE ORAL
Qty: 26 CAP | Refills: 0 | Status: SHIPPED | OUTPATIENT
Start: 2020-08-04 | End: 2020-09-30 | Stop reason: SDUPTHER

## 2020-09-30 DIAGNOSIS — F90.0 ATTENTION DEFICIT HYPERACTIVITY DISORDER (ADHD), PREDOMINANTLY INATTENTIVE TYPE: ICD-10-CM

## 2020-09-30 RX ORDER — DEXTROAMPHETAMINE SACCHARATE, AMPHETAMINE ASPARTATE MONOHYDRATE, DEXTROAMPHETAMINE SULFATE AND AMPHETAMINE SULFATE 7.5; 7.5; 7.5; 7.5 MG/1; MG/1; MG/1; MG/1
30 CAPSULE, EXTENDED RELEASE ORAL
Qty: 26 CAP | Refills: 0 | Status: SHIPPED | OUTPATIENT
Start: 2020-09-30 | End: 2021-01-02 | Stop reason: SDUPTHER

## 2020-12-15 ENCOUNTER — VIRTUAL VISIT (OUTPATIENT)
Dept: INTERNAL MEDICINE CLINIC | Age: 25
End: 2020-12-15
Payer: MEDICAID

## 2020-12-15 DIAGNOSIS — F90.0 ATTENTION DEFICIT HYPERACTIVITY DISORDER (ADHD), PREDOMINANTLY INATTENTIVE TYPE: ICD-10-CM

## 2020-12-15 PROCEDURE — 99212 OFFICE O/P EST SF 10 MIN: CPT | Performed by: INTERNAL MEDICINE

## 2020-12-15 RX ORDER — DEXTROAMPHETAMINE SACCHARATE, AMPHETAMINE ASPARTATE MONOHYDRATE, DEXTROAMPHETAMINE SULFATE AND AMPHETAMINE SULFATE 7.5; 7.5; 7.5; 7.5 MG/1; MG/1; MG/1; MG/1
30 CAPSULE, EXTENDED RELEASE ORAL
Qty: 26 CAP | Refills: 0 | Status: CANCELLED | OUTPATIENT
Start: 2020-12-15

## 2020-12-15 NOTE — PROGRESS NOTES
Chief Complaint   Patient presents with    Medication Refill           1. Have you been to the ER, urgent care clinic since your last visit? Hospitalized since your last visit? No    2. Have you seen or consulted any other health care providers outside of the [address] [place] [address] since your last visit? Include any pap smears or colon screening.  No

## 2020-12-16 NOTE — PROGRESS NOTES
Claude Carolina is a 30 y.o. male who was seen by synchronous (real-time) audio-video technology on 12/15/2020 for Medication Refill        Assessment & Plan:   Diagnoses and all orders for this visit:    1. Attention deficit hyperactivity disorder (ADHD), predominantly inattentive type  -     amphetamine-dextroamphetamine XR (ADDERALL XR) 30 mg XR capsule; Take 1 Cap by mouth every morning. Max Daily Amount: 30 mg. Patient wishes to have a refill on his Adderall. The last time he was seen was in June, but he needs to have a urine drug screen done prior to getting additional medication for now. Subjective:   Patient is seen on telemedicine and wishes to have a refill on his medication for ADHD. Prior to Admission medications    Medication Sig Start Date End Date Taking? Authorizing Provider   amphetamine-dextroamphetamine XR (ADDERALL XR) 30 mg XR capsule Take 1 Cap by mouth every morning. Max Daily Amount: 30 mg. 9/30/20  Yes Dottie Teran MD   sildenafil citrate (VIAGRA) 50 mg tablet Take 1 tablet by mouth at least 1 hour before intercourse 11/12/19  Yes Saurav Preston MD     Current Outpatient Medications   Medication Sig Dispense Refill    amphetamine-dextroamphetamine XR (ADDERALL XR) 30 mg XR capsule Take 1 Cap by mouth every morning. Max Daily Amount: 30 mg. 26 Cap 0    sildenafil citrate (VIAGRA) 50 mg tablet Take 1 tablet by mouth at least 1 hour before intercourse 5 Tab 1     No Known Allergies  Past Medical History:   Diagnosis Date    ADD (attention deficit hyperactivity disorder, inattentive type)     ADHD      Past Surgical History:   Procedure Laterality Date    HX WISDOM TEETH EXTRACTION       Family History   Problem Relation Age of Onset    Other Mother         ida    Asthma Brother        ROS:/    Objective:   No flowsheet data found.      [INSTRUCTIONS:  \"[x]\" Indicates a positive item  \"[]\" Indicates a negative item  -- DELETE ALL ITEMS NOT EXAMINED]    Constitutional: [x] Appears well-developed and well-nourished [x] No apparent distress      [] Abnormal -     Mental status: [x] Alert and awake  [x] Oriented to person/place/time [x] Able to follow commands    [] Abnormal -     Eyes:   EOM    [x]  Normal    [] Abnormal -   Sclera  [x]  Normal    [] Abnormal -          Discharge [x]  None visible   [] Abnormal -     HENT: [x] Normocephalic, atraumatic  [] Abnormal -   [x] Mouth/Throat: Mucous membranes are moist    External Ears [x] Normal  [] Abnormal -    Neck: [x] No visualized mass [] Abnormal -     Pulmonary/Chest: [x] Respiratory effort normal   [x] No visualized signs of difficulty breathing or respiratory distress        [] Abnormal -      Musculoskeletal:   [x] Normal gait with no signs of ataxia         [x] Normal range of motion of neck        [] Abnormal -     Neurological:        [x] No Facial Asymmetry (Cranial nerve 7 motor function) (limited exam due to video visit)          [x] No gaze palsy        [] Abnormal -          Skin:        [x] No significant exanthematous lesions or discoloration noted on facial skin         [] Abnormal -            Psychiatric:       [x] Normal Affect [] Abnormal -        [x] No Hallucinations    Other pertinent observable physical exam findings:-        We discussed the expected course, resolution and complications of the diagnosis(es) in detail. Medication risks, benefits, costs, interactions, and alternatives were discussed as indicated. I advised him to contact the office if his condition worsens, changes or fails to improve as anticipated. He expressed understanding with the diagnosis(es) and plan. Latoya Ibanez, who was evaluated through a patient-initiated, synchronous (real-time) audio-video encounter, and/or his healthcare decision maker, is aware that it is a billable service, with coverage as determined by his insurance carrier.  He provided verbal consent to proceed: Yes, and patient identification was verified. It was conducted pursuant to the emergency declaration under the [facility], [address] [place] [address] authority and the [employer] and [employer] Act. A caregiver was present when appropriate. Ability to conduct physical exam was limited. I was at home. The patient was at home. Please note that this dictation was completed with JET[employer], the computer voice recognition software. Quite often unanticipated grammatical, syntax, homophones, and other interpretive errors are inadvertently transcribed by the computer software. Please disregard these errors. Please excuse any errors that have escaped final proofreading. Thank you.     Angeles Payan MD

## 2021-01-02 DIAGNOSIS — F90.0 ATTENTION DEFICIT HYPERACTIVITY DISORDER (ADHD), PREDOMINANTLY INATTENTIVE TYPE: ICD-10-CM

## 2021-01-02 RX ORDER — DEXTROAMPHETAMINE SACCHARATE, AMPHETAMINE ASPARTATE MONOHYDRATE, DEXTROAMPHETAMINE SULFATE AND AMPHETAMINE SULFATE 7.5; 7.5; 7.5; 7.5 MG/1; MG/1; MG/1; MG/1
30 CAPSULE, EXTENDED RELEASE ORAL
Qty: 26 CAP | Refills: 0 | Status: SHIPPED | OUTPATIENT
Start: 2021-01-02 | End: 2021-02-09 | Stop reason: SDUPTHER

## 2021-02-09 DIAGNOSIS — F90.0 ATTENTION DEFICIT HYPERACTIVITY DISORDER (ADHD), PREDOMINANTLY INATTENTIVE TYPE: ICD-10-CM

## 2021-02-09 RX ORDER — DEXTROAMPHETAMINE SACCHARATE, AMPHETAMINE ASPARTATE MONOHYDRATE, DEXTROAMPHETAMINE SULFATE AND AMPHETAMINE SULFATE 7.5; 7.5; 7.5; 7.5 MG/1; MG/1; MG/1; MG/1
30 CAPSULE, EXTENDED RELEASE ORAL
Qty: 26 CAP | Refills: 0 | Status: SHIPPED | OUTPATIENT
Start: 2021-02-09 | End: 2021-04-15 | Stop reason: SDUPTHER

## 2021-02-20 LAB — SARS-COV-2, NAA: NOT DETECTED

## 2021-02-24 ENCOUNTER — HOSPITAL ENCOUNTER (OUTPATIENT)
Dept: PHYSICAL THERAPY | Age: 26
Discharge: HOME | End: 2021-02-24
Payer: MEDICAID

## 2021-02-24 PROCEDURE — 97165 OT EVAL LOW COMPLEX 30 MIN: CPT

## 2021-02-24 NOTE — PROGRESS NOTES
[facility]  [address]  [place], [address]  [phone] fax    Plan of Care/Statement of Necessity for Occupational Therapy Services    Patient name: Evette Saha Start of Care: 2021   Referral source: Chester Magana : 1995    Medical Diagnosis: Right elbow pain [M25.521]  Payor: BLUE CROSS MEDICAID / Plan: [address] [place] [address] / Product Type: Managed Care Medicaid /  Onset Date:2021    Treatment Diagnosis: right elbow pain   Prior Hospitalization: see medical history Provider#: [id number]   Medications: Verified on Patient summary List    Comorbidities: none reported   Prior Level of Function: Independent with ADL/IADL tasks without functional limitations of dominant right UE. The Plan of Care and following information is based on the information from the initial evaluation. Assessment/ key information: Patient is a right hand dominant 30 y.o. male with a chief complaint  of right elbow pain s/p  right elbow capsular release on 2021. Pt reports he fell into his elbow >10 years ago and it was fractured. He did not seek treatment at the time for his fracture and over time he became limited in his functional use and it was painful to use the right arm which resulted in surgical intervention. Pt presents to skilled OT today with right UE sling donned and surgical dressings applied. Upon removal of dressing, there are two surgical incisions that are intact, clean and dry. The lateral aspect of the elbow has a 1.5 cm incision site with three sutures intact and the medial elbow has a 1 cm incision with two sutures intact. There is no tenderness with palpation to the incision sites. His right shoulder, wrist, forearm and hand have ROM WNL. His elbow ROM is limited to 45 degrees extension and 122 degrees flexion.   There is mild post-surgical edema about the right elbow but otherwise his right UE circumferential measurements are similar to his left UE. He is able to verbalize his NWB status of the right UE at this time. He is currently requiring Min A with ADLs and Max A with IADLS. Patient received an initial evaluation today followed by education as to diagnosis, precautions and treatment plan. Patient was provided with a basic home exercise program including shoulder, elbow, forearm, wrist and hand AROM. Skilled OT services are necessary to address deficits and improve quality of life. Evaluation Complexity: History LOW Complexity : Brief history review  Examination LOW Complexity : 1-3 performance deficits relating to physical, cognitive , or psychosocial skils that result in activity limitations and / or participation restrictions  Clinical Decision Making LOW Complexity : No comorbidities that affect functional and no verbal or physical assistance needed to complete eval tasks   Overall Complexity Rating: LOW   Patient would benefit from OT/Hand therapy services for the following problems:  Problem List: Pain effecting function, Decreased range of motion, Decreased strength, Edema effecting function, Decreased coordination/prehension, Decreased ADL/functional abilities , Decreased activity tolerance, Decreased flexibility/joint mobility and Decreased transfer abilities   Treatment Plan may include any combination of the following: Therapeutic exercise, Therapeutic activities, Neuromuscular re-education, Physical agent/modality, Scar management, Manual therapy, Splinting/orthoses, Wound care, Patient education and ADLs/IADLs  Patient / Family readiness to learn indicated by: asking questions, trying to perform skills and interest  Persons(s) to be included in education:   patient (P)  Barriers to Learning/Limitations: None  Patient Goal (s): use my arm like normal  Patient Self Reported Health Status: excellent  Rehabilitation Potential: good  Short Term Goals:  To be accomplished in 2 weeks: 1. Patient will be compliant with initial home exercise program to take an active role in their rehabilitation process. Status at Eval: Patient was provided with a basic home exercise program including shoulder, elbow, forearm, wrist and hand AROM. 2. Patient will demonstrate a good understanding of their condition and strategies for self-management. Status at Eval: pt educated on wound care, edema mgmt, prognosis, treatment plan    Long Term Goals: To be accomplished in 4 weeks:   1. Patient will have 10 degrees or less of right elbow extension in order to increase ease with ADL's such as bathing, eating and dressing and to eventually bear weight thru the right upper extremity as in pushing up from a chair. Status at Eval: 45 deg    2. Patient will have 140 degrees of right elbow flexion in order to perform hygiene tasks and /or work with tools such as a screwdriver. Status at Eval: 122 deg    3. Patient will have 75 degrees of right forearm supination in order to perform ADL's including washing face and accepting change. Status at Eval: 68 deg    4. Patient will demonstrate a 0.5 cm decrease in edema of the right elbow in order to use arm for dressing and grooming tasks. Status at Eval: 28.1 cm     5. Patient will show a 20 point improvement on FOTO functional status measure to improve overall functional performance. Status at Eval: 4    Frequency / Duration: Patient to be seen 3 times per week for 4 weeks:    Patient/ Caregiver education and instruction: Diagnosis, prognosis, self care, activity modification, brace/ splint application and exercises  [x]  Plan of care has been reviewed with Ashely Pascual OT 2/24/2021 2:43 PM  ________________________________________________________________________    I certify that the above Therapy Services are being furnished while the patient is under my care.  I agree with the treatment plan and certify that this therapy is necessary.     Physician's Signature:____________Date:_________TIME:________     Emmie Rodriguez Signature, Date and Time must be completed for valid certification **    Please sign and return to [facility] [address]  [address]  [place], [address]  [phone] fax

## 2021-03-01 ENCOUNTER — HOSPITAL ENCOUNTER (OUTPATIENT)
Dept: PHYSICAL THERAPY | Age: 26
Discharge: HOME | End: 2021-03-01
Payer: MEDICAID

## 2021-03-01 PROCEDURE — 97110 THERAPEUTIC EXERCISES: CPT

## 2021-03-01 PROCEDURE — 97530 THERAPEUTIC ACTIVITIES: CPT

## 2021-03-01 PROCEDURE — 97535 SELF CARE MNGMENT TRAINING: CPT

## 2021-03-01 NOTE — PROGRESS NOTES
OT DAILY TREATMENT NOTE     Patient Name: Negra Degroot  Date:3/1/2021  : 1995  [x]  Patient  Verified  Payor: BLUE CROSS MEDICAID / Plan: [facility] HEALTHKEEPERS PLUS / Product Type: Managed Care Medicaid /    In time:10:47 AM  Out time:11:31 AM  Total Treatment Time (min): 44  Visit #: 2 of 8    Medicare/BCBS Only   Total Timed Codes (min):  34 1:1 Treatment Time:  44     Treatment Area: Right elbow pain [M25.521]    SUBJECTIVE  Pain Level (0-10 scale): 10  Any medication changes, allergies to medications, adverse drug reactions, diagnosis change, or new procedure performed?: [x] No    [] Yes (see summary sheet for update)  Subjective functional status/changes:   [] No changes reported  \"It's a little sore. \"    OBJECTIVE    Modality rationale: decrease pain and increase tissue extensibility to improve the patients ability to extend right elbow   Min Type Additional Details    [] Estim:  []Unatt       []IFC  []Premod                        []Other:  []w/ice   []w/heat  Position:  Location:    [] Estim: []Att    []TENS instruct  []NMES                    []Other:  []w/US   []w/ice   []w/heat  Position:  Location:    []  Traction: [] Cervical       []Lumbar                       [] Prone          []Supine                       []Intermittent   []Continuous Lbs:  [] before manual  [] after manual    []  Ultrasound: []Continuous   [] Pulsed                           []1MHz   []3MHz W/cm2:  Location:    []  Iontophoresis with dexamethasone         Location: [] Take home patch   [] In clinic   10 []  Ice     [x]  heat  []  Ice massage  []  Laser   []  Paraffin Position: resting  Location: right elbow    []  Laser with stim  []  Other:  Position:  Location:    []  Vasopneumatic Device Pressure:       [] lo [] med [] hi   Temperature: [] lo [] med [] hi       [x] Skin assessment post-treatment:  [x]intact [x]redness- no adverse reaction    []redness  adverse reaction:     24 min Therapeutic Exercise: [x] See flow sheet :   Rationale: increase ROM to improve the patients ability to move right elbow    10 min Therapeutic Activity:  [x]  See flow sheet :   Rationale: increase ROM and improve coordination  to improve the patients ability to grasp    10 min Self Care/Home Management: activity modification, increasing use of right UE for daily tasks. Rationale: education  to improve the patients ability to progress with rehab for right UE. (Discussed while patient rested with moist heat pack on right elbow)    With   [] TE   [] TA   [] neuro   [] other: Patient Education: [x] Review HEP    [] Progressed/Changed HEP based on:   [] positioning   [] body mechanics   [] transfers   [] heat/ice application   [] Splint wear/care   [] Sensory re-education   [] scar management      [] other:            Other Objective/Functional Measures: Range of Motion:   Elbow/Wrist   Wrist 2/24/2021   Right  3/1/2021  Right  AROM/PROM         Flex 72           Ext 70           UD             RD             FA              Pro 75           Sup 68           Elbow             Flex 122 130          Ext 45 21/18            Edema: GIRTH CHART measured in cm  Date: 2/24/2021  3/1/2021      Side Right/Left  Right   DPC circum. 20.7/20.8     Wrist Crease 17.2/17.0     FA  24.3/25.4     Elbow 28.1/25.6  27.0        Pain Level (0-10 scale) post treatment: 4/10    ASSESSMENT/Changes in Function: right UE ROM improving. Reduced edema. Progressed with updated HEP to include PROM of right UE. No difficulty with activity during this treatment session. Patient will continue to benefit from skilled OT services to modify and progress therapeutic interventions, address ROM deficits, address strength deficits and instruct in home and community integration to attain remaining goals. []  See Plan of Care  []  See progress note/recertification  []  See Discharge Summary         Progress towards goals / Updated goals:  Short Term Goals:  To be accomplished in 2  weeks:  1. Patient will be compliant with initial home exercise program to take an active role in their rehabilitation process. Status at Eval: Patient was provided with a basic home exercise program including shoulder, elbow, forearm, wrist and hand AROM.    3/1/2021 - pt reports compliance with HEP 3x per day. Goal met     2. Patient will demonstrate a good understanding of their condition and strategies for self-management. Status at Eval: pt educated on wound care, edema mgmt, prognosis, treatment plan     Long Term Goals: To be accomplished in 4 weeks:          1. Patient will have 10 degrees or less of right elbow extension in order to increase ease with ADL's such as bathing, eating and dressing and to eventually bear weight thru the right upper extremity as in pushing up from a chair. Status at [facility]: 45 deg  3/1/2021 - 21 deg     2. Patient will have 140 degrees of right elbow flexion in order to perform hygiene tasks and /or work with tools such as a screwdriver. Status at Eval: 122 deg  3/1/2021 - 130 deg     3. Patient will have 75 degrees of right forearm supination in order to perform ADL's including washing face and accepting change. Status at Eval: 68 deg     4. Patient will demonstrate a 0.5 cm decrease in edema of the right elbow in order to use arm for dressing and grooming tasks. Status at [facility]: 28.1 cm  3/1/2021 - 27.0 cm      5. Patient will show a 20 point improvement on FOTO functional status measure to improve overall functional performance.   Status at Eval: 4    PLAN  []  Upgrade activities as tolerated     [x]  Continue plan of care  []  Update interventions per flow sheet       []  Discharge due to:_  []  Other:_      Phyllis Gongora OT 3/1/2021  10:53 AM    Future Appointments   Date Time Provider Twyla Ceron   3/3/2021  2:00 PM Grace Holley OTR/L [facility]   3/5/2021  2:45 PM Grace Holley OTR/L [facility]   3/8/2021 10:45 AM Js Barrett ANABELLE Escobar [facility]   3/10/2021  8:45 AM Christopher Arce OTR/L [facility]   3/12/2021 10:15 AM Christopher Arce OTR/L [facility]   3/15/2021  4:15 PM Gudelia Hogan MD [facility]

## 2021-03-03 ENCOUNTER — HOSPITAL ENCOUNTER (OUTPATIENT)
Dept: PHYSICAL THERAPY | Age: 26
Discharge: HOME | End: 2021-03-03
Payer: MEDICAID

## 2021-03-03 PROCEDURE — 97110 THERAPEUTIC EXERCISES: CPT

## 2021-03-03 PROCEDURE — 97535 SELF CARE MNGMENT TRAINING: CPT

## 2021-03-03 PROCEDURE — 97530 THERAPEUTIC ACTIVITIES: CPT

## 2021-03-03 NOTE — PROGRESS NOTES
OT DAILY TREATMENT NOTE     Patient Name: Michelle Dale  Date:3/3/2021  : 1995  [x]  Patient  Verified  Payor: BLUE CROSS MEDICAID / Plan: [facility] HEALTHKEEPERS PLUS / Product Type: Managed Care Medicaid /    In time:2:00  Out time:2:39  Total Treatment Time (min): 39  Visit #: 3 of 12  Treatment Area: Right elbow pain [M25.521]    SUBJECTIVE  Pain Level (0-10 scale): 2/10  Any medication changes, allergies to medications, adverse drug reactions, diagnosis change, or new procedure performed?: [x] No    [] Yes (see summary sheet for update)  Subjective functional status/changes:   [] No changes reported  \"It looks good to me\"    OBJECTIVE            Modality rationale: decrease pain and increase tissue extensibility to improve the patients ability to extend right elbow   Min Type Additional Details      []? Estim:  []? Unatt       []? IFC  []? Premod                        []?Other:  []?w/ice   []?w/heat  Position:  Location:      []? Estim: []? Att    []? TENS instruct  []? NMES                    []?Other:  []?w/US   []?w/ice   []?w/heat  Position:  Location:      []? Traction: []? Cervical       []? Lumbar                       []? Prone          []? Supine                       []?Intermittent   []? Continuous Lbs:  []? before manual  []? after manual      []? Ultrasound: []? Continuous   []? Pulsed                           []? 1MHz   []? 3MHz W/cm2:  Location:      []? Iontophoresis with dexamethasone         Location: []? Take home patch   []? In clinic    10 []? Ice     [x]? heat  []? Ice massage  []? Laser   []? Paraffin Position: resting  Location: right elbow      []? Laser with stim  []? Other:  Position:  Location:      []?   Vasopneumatic Device Pressure:       []? lo []? med []? hi   Temperature: []? lo []? med []? hi          12 min Therapeutic Exercise:  [x] See flow sheet :   Rationale: increase ROM to improve the patients ability to improve pt's ability to use right upper extremity to perform upper body dressing    9 min Therapeutic Activity:  [x]  See flow sheet :   Rationale: increase ROM and improve coordination  to improve the patients ability to reach for ADL items in cabinets. 8 min Self Care/Home Management: educated on functional use of Right upper extremity for ADLs while ensuring no lifting or pulling with right arm. Rationale: to improve safety during ADls to allow for appropriate healing. With   [x] TE   [] TA   [] neuro   [] other: Patient Education: [x] Review HEP    [] Progressed/Changed HEP based on:   [] positioning   [] body mechanics   [] transfers   [] heat/ice application   [] Splint wear/care   [] Sensory re-education   [] scar management      [] other:          Other Objective/Functional Measures: improving AROM during mult clinic activities. Pain Level (0-10 scale) post treatment: 2/10    ASSESSMENT/Changes in Function: Pt reports he notices daily improvements in AROM and function in right upper extremity. Pt was able to tolerate repetitive reaching tasks without c/o discomfort and pain. Patient will continue to benefit from skilled OT services to modify and progress therapeutic interventions, address ROM deficits, address strength deficits, analyze and cue movement patterns and instruct in home and community integration to attain remaining goals. [x]  See Plan of Care  []  See progress note/recertification  []  See Discharge Summary         Progress towards goals / Updated goals:  Short Term Goals: To be accomplished in 2  weeks:  1. Patient will be compliant with initial home exercise program to take an active role in their rehabilitation process. Status at [facility]: Patient was provided with a basic home exercise program including shoulder, elbow, forearm, wrist and hand AROM.    3/1/2021 - pt reports compliance with HEP 3x per day.  Goal met     2. Patient will demonstrate a good understanding of their condition and strategies for self-management. Status at Eval: pt educated on wound care, edema mgmt, prognosis, treatment plan     Long Term Goals: To be accomplished in 4 weeks:          1. Patient will have 10 degrees or less of right elbow extension in order to increase ease with ADL's such as bathing, eating and dressing and to eventually bear weight thru the right upper extremity as in pushing up from a chair. Status at Eval: 45 deg  3/1/2021 - 21 deg     2. Patient will have 140 degrees of right elbow flexion in order to perform hygiene tasks and /or work with tools such as a screwdriver. Status at Eval: 122 deg  3/1/2021 - 130 deg     3. Patient will have 75 degrees of right forearm supination in order to perform ADL's including washing face and accepting change. Status at Eval: 68 deg     4. Patient will demonstrate a 0.5 cm decrease in edema of the right elbow in order to use arm for dressing and grooming tasks. Status at Eval: 28.1 cm  3/1/2021 - 27.0 cm      5. Patient will show a 20 point improvement on FOTO functional status measure to improve overall functional performance.   Status at Eval: 4    PLAN  [x]  Upgrade activities as tolerated     [x]  Continue plan of care  []  Update interventions per flow sheet       []  Discharge due to:_  []  Other:_       Prince Rivera OTR/L 3/3/2021  1:57 PM    Future Appointments   Date Time Provider [facility]   3/3/2021  2:00 PM Ki Pool OTR/L [facility]   3/5/2021  2:45 PM Ki Pool OTR/L [facility]   3/8/2021 10:45 AM Leti Terrazas OT [facility]   3/10/2021  8:45 AM Ki Pool OTR/L [facility]   3/12/2021 10:15 AM Ki Pool OTR/L [facility]   3/15/2021  4:15 PM Gaston Velásquez MD [facility]

## 2021-03-05 ENCOUNTER — HOSPITAL ENCOUNTER (OUTPATIENT)
Dept: PHYSICAL THERAPY | Age: 26
Discharge: HOME | End: 2021-03-05
Payer: MEDICAID

## 2021-03-05 PROCEDURE — 97110 THERAPEUTIC EXERCISES: CPT

## 2021-03-05 PROCEDURE — 97535 SELF CARE MNGMENT TRAINING: CPT

## 2021-03-05 NOTE — PROGRESS NOTES
OT DAILY TREATMENT NOTE     Patient Name: Evette Saha  Date:3/5/2021  : 1995  [x]  Patient  Verified  Payor: BLUE CROSS MEDICAID / Plan: [facility] [place] [employer]KEEPERS PLUS / Product Type: Managed Care Medicaid /    In time:2:45  Out time:3:25  Total Treatment Time (min): 40  Visit #: 4 of 12  Treatment Area: Right elbow pain [M25.521]    SUBJECTIVE  Pain Level (0-10 scale): 2-3  Any medication changes, allergies to medications, adverse drug reactions, diagnosis change, or new procedure performed?: [x] No    [] Yes (see summary sheet for update)  Subjective functional status/changes:   [] No changes reported  \"I have no restrictions. I drove here. \"    OBJECTIVE    Modality rationale: decrease edema, decrease pain and increase tissue extensibility to improve the patients ability to improve pt's ability to use right arm   Min Type Additional Details    [] Estim:  []Unatt       []IFC  []Premod                        []Other:  []w/ice   []w/heat  Position:  Location:    [] Estim: []Att    []TENS instruct  []NMES                    []Other:  []w/US   []w/ice   []w/heat  Position:  Location:    []  Traction: [] Cervical       []Lumbar                       [] Prone          []Supine                       []Intermittent   []Continuous Lbs:  [] before manual  [] after manual    []  Ultrasound: []Continuous   [] Pulsed                           []1MHz   []3MHz W/cm2:  Location:    []  Iontophoresis with dexamethasone         Location: [] Take home patch   [] In clinic   10 []  Ice     [x]  heat  []  Ice massage  []  Laser   []  Paraffin Position: at rest  Location: right elbow    []  Laser with stim  []  Other:  Position:  Location:    []  Vasopneumatic Device Pressure:       [] lo [] med [] hi   Temperature: [] lo [] med [] hi       [x] Skin assessment post-treatment:  []intact [x]redness- no adverse reaction    []redness  adverse reaction:       30 min Therapeutic Exercise:  [x] See flow sheet : Rationale: increase ROM and increase strength to improve the patients ability to use right hand to hold heavy objects. 10 min/w MHP Self Care/Home Management: during MHP, education on incorporation of RUE in all ADLs, icing, gradual progression of activities and exercises, issued silicone gel pads for scar care. Rationale: increase ROM and increase strength  to improve the patients ability to use right hand for ADLs    With   [] TE   [] TA   [] neuro   [] other: Patient Education: [x] Review HEP    [] Progressed/Changed HEP based on:   [] positioning   [] body mechanics   [] transfers   [] heat/ice application   [] Splint wear/care   [] Sensory re-education   [] scar management      [] other:            Other Objective/Functional Measures:    Measurements: Taken with Herman Dynamometer, in Lbs   Level 2 3/5/2021   Date Date Date Date Date Date   Right 84         Left 87         Deficit          Change               Pain Level (0-10 scale) post treatment: 2/10    ASSESSMENT/Changes in Function: Pt comes in with orders from ortho for full ROM and strengthening without restrictions. Pt's incisions are healing with mild redness over the median incision. Educated pt on scar care, issued silicone gel pads to assist with scar remodeling. Pt educated on progressive strengthening strategies to maximize safety and rehab potential. Pt's  is slightly less in right hand as compared to left, educated pt on  strengthening techniques to perform as HEP. Pt verbalized understanding. Introduced light right upper extremity shoulder and elbow strengthening exercises, issued as HEP, no pain reported during all activities.     Patient will continue to benefit from skilled OT services to modify and progress therapeutic interventions, address ROM deficits, address strength deficits, analyze and address soft tissue restrictions, analyze and cue movement patterns and instruct in home and community integration to attain remaining goals. [x]  See Plan of Care  []  See progress note/recertification  []  See Discharge Summary         Progress towards goals / Updated goals:  Short Term Goals: To be accomplished in 2  weeks:  1. Patient will be compliant with initial home exercise program to take an active role in their rehabilitation process. Status at Eval: Patient was provided with a basic home exercise program including shoulder, elbow, forearm, wrist and hand AROM.    3/1/2021 - pt reports compliance with HEP 3x per day. Goal met     2. Patient will demonstrate a good understanding of their condition and strategies for self-management. Status at Eval: pt educated on wound care, edema mgmt, prognosis, treatment plan  3/5/2021: Educated pt on gradual progression with strengthening tasks, and on scar care to facilitate scar remodeling. Pt verbalized understanding.      Long Term Goals: To be accomplished in 4 weeks:          1. Patient will have 10 degrees or less of right elbow extension in order to increase ease with ADL's such as bathing, eating and dressing and to eventually bear weight thru the right upper extremity as in pushing up from a chair. Status at [facility]: 45 deg  3/1/2021 - 21 deg     2. Patient will have 140 degrees of right elbow flexion in order to perform hygiene tasks and /or work with tools such as a screwdriver. Status at [facility]: 122 deg  3/1/2021 - 130 deg     3. Patient will have 75 degrees of right forearm supination in order to perform ADL's including washing face and accepting change. Status at Eval: 68 deg     4. Patient will demonstrate a 0.5 cm decrease in edema of the right elbow in order to use arm for dressing and grooming tasks. Status at Eval: 28.1 cm  3/1/2021 - 27.0 cm      5. Patient will show a 20 point improvement on FOTO functional status measure to improve overall functional performance.   Status at Eval: 4       PLAN  [x]  Upgrade activities as tolerated     [x]  Continue plan of care  []  Update interventions per flow sheet       []  Discharge due to:_  []  Other:_      Laly Bhandari, OTR/L 3/5/2021  2:43 PM    Future Appointments   Date Time Provider Twyla Ceron   3/5/2021  2:45 PM Bonita Luna OTR/L [facility]   3/8/2021 10:45 AM Gerhard Crawford OT [facility]   3/10/2021  8:45 AM Bonita Luna OTR/L [facility]   3/12/2021 10:15 AM Bonita Luna OTR/L [facility]   3/15/2021  4:15 PM Sil Damon MD [facility]

## 2021-03-08 ENCOUNTER — HOSPITAL ENCOUNTER (OUTPATIENT)
Dept: PHYSICAL THERAPY | Age: 26
Discharge: HOME | End: 2021-03-08
Payer: MEDICAID

## 2021-03-08 PROCEDURE — 97110 THERAPEUTIC EXERCISES: CPT

## 2021-03-08 PROCEDURE — 97530 THERAPEUTIC ACTIVITIES: CPT

## 2021-03-08 PROCEDURE — 97535 SELF CARE MNGMENT TRAINING: CPT

## 2021-03-08 NOTE — PROGRESS NOTES
OT DAILY TREATMENT NOTE     Patient Name: Hershel Collet  Date:3/8/2021  : 1995  [x]  Patient  Verified  Payor: BLUE CROSS MEDICAID / Plan: [facility] [employer]KEEPERS PLUS / Product Type: Managed Care Medicaid /    In time:10:46 AM  Out time:11:29 AM  Total Treatment Time (min): 43  Visit #: 5 of 12    Treatment Area: Right elbow pain [M25.521]    SUBJECTIVE  Pain Level (0-10 scale): 0/10  Any medication changes, allergies to medications, adverse drug reactions, diagnosis change, or new procedure performed?: [x] No    [] Yes (see summary sheet for update)  Subjective functional status/changes:   [] No changes reported  \"I'm really feeling more back to normal.\"    OBJECTIVE    23 min Therapeutic Exercise:  [x] See flow sheet :   Rationale: increase ROM, increase strength, improve coordination and increase proprioception to improve the patients ability to grasp, , and lift using right UE.     10 min Therapeutic Activity:  [x]  See flow sheet :   Rationale: increase ROM and increase proprioception  to improve the patients ability to weight bear through right UE.     10 min Self Care/Home Management: updating HEP    Rationale: education  to improve the patients ability to progress with right UE strengthening    With   [] TE   [] TA   [] neuro   [] other: Patient Education: [x] Review HEP    [] Progressed/Changed HEP based on:   [] positioning   [] body mechanics   [] transfers   [] heat/ice application   [] Splint wear/care   [] Sensory re-education   [] scar management      [] other:            Other Objective/Functional Measures:  Measurements: Taken with Herman Dynamometer, in Lbs   Level 2 3/5/2021    Date Date Date Date Date Date   Right 80               Left 80               Deficit                 Change                      Range of Motion:   Elbow/Wrist   Wrist 2021   Right  3/1/2021  Right  AROM/PROM  3/8/2021   Right       Flex 72           Ext 70           UD             RD             FA              Pro 75    80       Sup 68    78       Elbow             Flex 122 130   136       Ext 45 21/18   12          Edema: GIRTH CHART measured in cm  Date: 2/24/2021  3/1/2021      Side Right/Left  Right   DPC circum. 20.7/20.8     Wrist Crease 17.2/17.0     FA  24.3/25.4     Elbow 28.1/25.6  27.0         Pain Level (0-10 scale) post treatment: 0/10    ASSESSMENT/Changes in Function: Progressed with right UE strengthening and provided and instructed in updated HEP for wrist, forearm, biceps, and triceps strengthening. No difficulty with increased resistance during this treatment session. Progressing well towards elbow ROM goals. Patient will continue to benefit from skilled OT services to modify and progress therapeutic interventions, address ROM deficits, address strength deficits and instruct in home and community integration to attain remaining goals. []  See Plan of Care  []  See progress note/recertification  []  See Discharge Summary         Progress towards goals / Updated goals:  Short Term Goals: To be accomplished in 2  weeks:  1. Patient will be compliant with initial home exercise program to take an active role in their rehabilitation process. Status at [facility]: Patient was provided with a basic home exercise program including shoulder, elbow, forearm, wrist and hand AROM.    3/1/2021 - pt reports compliance with HEP 3x per day. Goal met     2. Patient will demonstrate a good understanding of their condition and strategies for self-management. Status at [facility]: pt educated on wound care, edema mgmt, prognosis, treatment plan  3/5/2021: Educated pt on gradual progression with strengthening tasks, and on scar care to facilitate scar remodeling. Pt verbalized understanding. 3/8/2021 - pt reports gaining confidence with gradual progression of strengthening, goal met     Long Term Goals: To be accomplished in 4 weeks:          1.  Patient will have 10 degrees or less of right elbow extension in order to increase ease with ADL's such as bathing, eating and dressing and to eventually bear weight thru the right upper extremity as in pushing up from a chair. Status at Eval: 45 deg  3/1/2021 - 21 deg  3/8/2021 - 12 deg     2. Patient will have 140 degrees of right elbow flexion in order to perform hygiene tasks and /or work with tools such as a screwdriver. Status at Eval: 122 deg  3/1/2021 - 130 deg  3/8/2021 - 136 deg     3. Patient will have 75 degrees of right forearm supination in order to perform ADL's including washing face and accepting change. Status at Eval: 68 deg  3/8/2021 - 78 deg, goal met     4. Patient will demonstrate a 0.5 cm decrease in edema of the right elbow in order to use arm for dressing and grooming tasks. Status at Eval: 28.1 cm  3/1/2021 - 27.0 cm goal met     5. Patient will show a 20 point improvement on FOTO functional status measure to improve overall functional performance.   Status at Eval: 4    PLAN  []  Upgrade activities as tolerated     [x]  Continue plan of care  []  Update interventions per flow sheet       []  Discharge due to:_  []  Other:_      Max Prather OT 3/8/2021  10:52 AM    Future Appointments   Date Time Provider Twyla Ceron   3/10/2021  8:45 AM Herb Scales OTR/L [facility]   3/12/2021 10:15 [facility]   3/15/2021  4:15 PM Teri Jackson MD [facility] AMB

## 2021-03-10 ENCOUNTER — HOSPITAL ENCOUNTER (OUTPATIENT)
Dept: PHYSICAL THERAPY | Age: 26
Discharge: HOME | End: 2021-03-10
Payer: MEDICAID

## 2021-03-10 PROCEDURE — 97535 SELF CARE MNGMENT TRAINING: CPT

## 2021-03-10 PROCEDURE — 97110 THERAPEUTIC EXERCISES: CPT

## 2021-03-10 NOTE — PROGRESS NOTES
OT DAILY TREATMENT NOTE     Patient Name: Jeimy Odonnell  Date:3/10/2021  : 1995  [x]  Patient  Verified  Payor: BLUE CROSS MEDICAID / Plan: [facility] HEALTHKEEPERS PLUS / Product Type: Managed Care Medicaid /    In time: 09:34  Out time: 10:10  Total Treatment Time (min): 34  Visit #: 6 of 12    Treatment Area: Right elbow pain [M25.521]    SUBJECTIVE  Pain Level (0-10 scale): 0  Any medication changes, allergies to medications, adverse drug reactions, diagnosis change, or new procedure performed?: [x] No    [] Yes (see summary sheet for update)  Subjective functional status/changes:   [] No changes reported  \"It is getting better\"    OBJECTIVE    Modality rationale: decrease inflammation, decrease pain and increase tissue extensibility to improve the patients ability to improve pt's ability to use right hand for IADLs.    Min Type Additional Details    [] Estim:  []Unatt       []IFC  []Premod                        []Other:  []w/ice   []w/heat  Position:  Location:    [] Estim: []Att    []TENS instruct  []NMES                    []Other:  []w/US   []w/ice   []w/heat  Position:  Location:    []  Traction: [] Cervical       []Lumbar                       [] Prone          []Supine                       []Intermittent   []Continuous Lbs:  [] before manual  [] after manual    []  Ultrasound: []Continuous   [] Pulsed                           []1MHz   []3MHz W/cm2:  Location:    []  Iontophoresis with dexamethasone         Location: [] Take home patch   [] In clinic   8 []  Ice     [x]  heat MHP  []  Ice massage  []  Laser   []  Paraffin Position: at rest with elbow extension  Location: R elbow    []  Laser with stim  []  Other:  Position:  Location:    []  Vasopneumatic Device Pressure:       [] lo [] med [] hi   Temperature: [] lo [] med [] hi       [x] Skin assessment post-treatment:  []intact [x]redness- no adverse reaction    26 min Therapeutic Exercise:  [x] See flow sheet :   Rationale: increase strength to improve the patients ability to maximize functional use of right upper extremity. 8 min Self Care/Home Management: education on scar massage and self-ROM to maximize rehab potential (with MHP)     With   [x] TE   [] TA   [] neuro   [] other: Patient Education: [x] Review HEP    [] Progressed/Changed HEP based on:   [] positioning   [] body mechanics   [] transfers   [] heat/ice application   [] Splint wear/care   [] Sensory re-education   [] scar management      [] other:            Other Objective/Functional Measures: increased resistance for mult exercises. Pain Level (0-10 scale) post treatment: 0    ASSESSMENT/Changes in Function: Pt demonstrates gradual progress with AROM and strengthening exercises, introduced light weight bearing exercises/activities with good effort and no pain reported from pt. Patient will continue to benefit from skilled OT services to modify and progress therapeutic interventions, address ROM deficits, address strength deficits and instruct in home and community integration to attain remaining goals. [x]  See Plan of Care  []  See progress note/recertification  []  See Discharge Summary         Progress towards goals / Updated goals:  1. Patient will be compliant with initial home exercise program to take an active role in their rehabilitation process. Status at [facility]: Patient was provided with a basic home exercise program including shoulder, elbow, forearm, wrist and hand AROM.    3/1/2021 - pt reports compliance with HEP 3x per day. Goal met     2. Patient will demonstrate a good understanding of their condition and strategies for self-management. Status at [facility]: pt educated on wound care, edema mgmt, prognosis, treatment plan  3/5/2021: Educated pt on gradual progression with strengthening tasks, and on scar care to facilitate scar remodeling.  Pt verbalized understanding.   3/8/2021 - pt reports gaining confidence with gradual progression of strengthening, goal met     Long Term Goals: To be accomplished in 4 weeks:          1. Patient will have 10 degrees or less of right elbow extension in order to increase ease with ADL's such as bathing, eating and dressing and to eventually bear weight thru the right upper extremity as in pushing up from a chair. Status at Eval: 45 deg  3/1/2021 - 21 deg  3/8/2021 - 12 deg     2. Patient will have 140 degrees of right elbow flexion in order to perform hygiene tasks and /or work with tools such as a screwdriver. Status at Eval: 122 deg  3/1/2021 - 130 deg  3/8/2021 - 136 deg     3. Patient will have 75 degrees of right forearm supination in order to perform ADL's including washing face and accepting change. Status at Eval: 68 deg  3/8/2021 - 78 deg, goal met     4. Patient will demonstrate a 0.5 cm decrease in edema of the right elbow in order to use arm for dressing and grooming tasks. Status at Eval: 28.1 cm  3/1/2021 - 27.0 cm goal met     5. Patient will show a 20 point improvement on FOTO functional status measure to improve overall functional performance.   Status at Eval: 4    PLAN  [x]  Upgrade activities as tolerated     [x]  Continue plan of care  []  Update interventions per flow sheet       []  Discharge due to:_  []  Other:_      INGRID Lizama/L 3/10/2021  9:39 AM    Future Appointments   Date Time Provider Twyla Ceron   3/12/2021 10:15 AM INGRID Walter/L [facility]   3/15/2021  4:15 PM Casimiro Stahl MD [facility] AMB

## 2021-03-12 ENCOUNTER — HOSPITAL ENCOUNTER (OUTPATIENT)
Dept: PHYSICAL THERAPY | Age: 26
Discharge: HOME | End: 2021-03-12
Payer: MEDICAID

## 2021-03-12 PROCEDURE — 97110 THERAPEUTIC EXERCISES: CPT

## 2021-03-12 PROCEDURE — 97535 SELF CARE MNGMENT TRAINING: CPT

## 2021-03-12 NOTE — PROGRESS NOTES
OT DISCHARGE DAILY NOTE AND SUMMARY     Date:3/12/2021  Patient name: Evette Saha Start of Care: 2021   Referral source: Chester Magana : 1995   Medical/Treatment Diagnosis: Right elbow pain [M25.521] Onset Date: 2021     Prior Hospitalization: see medical history Provider#: [id number]   Medications: Verified on Patient Summary List    Comorbidities: none reported   Prior Level of Function: Independent with ADL/IADL tasks without functional limitations of dominant right UE. Visits from Start of Care: 7    Missed Visits: 0    Reporting Period : 2021 to 3/12/2021    [x]  Patient  Verified  Payor: BLUE CROSS MEDICAID / Plan: [address] [place] [address] / Product Type: Managed Care Medicaid /    In time:10:15  Out time:1054  Total Treatment Time (min): 39  Visit #: 7 of 7  Treatment Area: Right elbow pain [M25.521]    SUBJECTIVE  Pain Level (0-10 scale): 0  Any medication changes, allergies to medications, adverse drug reactions, diagnosis change, or new procedure performed?: [x] No    [] Yes (see summary sheet for update)  Subjective functional status/changes:   [] No changes reported  \"I think I am good. I will be driving to [place] today or tomorrow. \"    OBJECTIVE    31 min Therapeutic Exercise:  [x] See flow sheet :   Rationale: increase ROM and increase strength to improve the patients ability to use right upper extremity for ADLs    8 min Self Care/Home Management: education on scar massage and silicone gel pad application to help with scar remodeling. Education on gradual increase in activity. Rationale: increase strength promote scar remodeling to prevent scar adhesions and improve quality of life.     With   [x] TE   [] TA   [] neuro   [] other: Patient Education: [x] Review HEP    [] Progressed/Changed HEP based on:   [] positioning   [] body mechanics   [] transfers   [] heat/ice application   [] Splint wear/care   [] Sensory re-education   [] scar management [] other:            Other Objective/Functional Measures:   Range of Motion:   Elbow/Wrist   Wrist 2/24/2021   Right  3/1/2021  Right  AROM/PROM  3/8/2021   Right  3/12/2021  Right     Flex 72           Ext 70           UD             RD             FA              Pro 75    80       Sup 68    78       Elbow             Flex 122 130   136  138     Ext 45 21/18   12  10            Pain Level (0-10 scale) post treatment: 0    Summary of Care:  1. Patient will be compliant with initial home exercise program to take an active role in their rehabilitation process. Status at [facility]: Patient was provided with a basic home exercise program including shoulder, elbow, forearm, wrist and hand AROM.    3/1/2021 - pt reports compliance with HEP 3x per day. Goal met     2. Patient will demonstrate a good understanding of their condition and strategies for self-management. Status at [facility]: pt educated on wound care, edema mgmt, prognosis, treatment plan  3/5/2021: Educated pt on gradual progression with strengthening tasks, and on scar care to facilitate scar remodeling. Pt verbalized understanding.   3/8/2021 - pt reports gaining confidence with gradual progression of strengthening, goal met     Long Term Goals: To be accomplished in 4 weeks:          1. Patient will have 10 degrees or less of right elbow extension in order to increase ease with ADL's such as bathing, eating and dressing and to eventually bear weight thru the right upper extremity as in pushing up from a chair. Status at [facility]: 45 deg  3/1/2021 - 21 deg  3/8/2021 - 12 deg  3/12/2021- 10 degrees. Goal met     2. Patient will have 140 degrees of right elbow flexion in order to perform hygiene tasks and /or work with tools such as a screwdriver. Status at [facility]: 122 deg  3/1/2021 - 130 deg  3/8/2021 - 136 deg  3/12/2021- 138 degrees, progressing       3.  Patient will have 75 degrees of right forearm supination in order to perform ADL's including washing face and accepting change. Status at Eval: 68 deg  3/8/2021 - 78 deg, goal met     4. Patient will demonstrate a 0.5 cm decrease in edema of the right elbow in order to use arm for dressing and grooming tasks. Status at Eval: 28.1 cm  3/1/2021 - 27.0 cm goal met     5. Patient will show a 20 point improvement on FOTO functional status measure to improve overall functional performance. Status at Eval: 4   Status at DC: 98. Goal met    ASSESSMENT/Changes in Function: Pt met his goal for right elbow extension (10 deg) this session and is close to meeting his right elbow flexion goal, currently at 138 degrees. Pt reports feeling comfortable with continuing with his HEP on his own and verbalized understanding of all education regarding scar care, stretching and progressive increase of functional use of right upper extremity.     PLAN:  [x]Discontinue therapy: [x]Patient has reached or is progressing toward set goals      []Patient is non-compliant or has abdicated      []Due to lack of appreciable progress towards set goals    Thank you for this referral!    INGRID Santos/L 3/12/2021 10:14 AM

## 2021-04-15 DIAGNOSIS — F90.0 ATTENTION DEFICIT HYPERACTIVITY DISORDER (ADHD), PREDOMINANTLY INATTENTIVE TYPE: ICD-10-CM

## 2021-04-15 RX ORDER — DEXTROAMPHETAMINE SACCHARATE, AMPHETAMINE ASPARTATE MONOHYDRATE, DEXTROAMPHETAMINE SULFATE AND AMPHETAMINE SULFATE 7.5; 7.5; 7.5; 7.5 MG/1; MG/1; MG/1; MG/1
30 CAPSULE, EXTENDED RELEASE ORAL
Qty: 26 CAP | Refills: 0 | Status: CANCELLED | OUTPATIENT
Start: 2021-04-15

## 2021-04-15 RX ORDER — DEXTROAMPHETAMINE SACCHARATE, AMPHETAMINE ASPARTATE MONOHYDRATE, DEXTROAMPHETAMINE SULFATE AND AMPHETAMINE SULFATE 7.5; 7.5; 7.5; 7.5 MG/1; MG/1; MG/1; MG/1
30 CAPSULE, EXTENDED RELEASE ORAL
Qty: 26 CAP | Refills: 0 | Status: SHIPPED | OUTPATIENT
Start: 2021-04-15

## 2021-07-07 ENCOUNTER — TELEPHONE (OUTPATIENT)
Dept: INTERNAL MEDICINE CLINIC | Age: 26
End: 2021-07-07

## 2021-07-07 NOTE — TELEPHONE ENCOUNTER
Patient just came into the office insisting on being seen by someone today. I advised multiple times that Dr. Hailee Zayas is the only provider that is currently accepting new patients and that a message has been sent to her and that we will call him one it is addressed.

## 2021-07-07 NOTE — TELEPHONE ENCOUNTER
Pt calling, says he was a patient of [facility] and wants to see AN as new pcp. Says he is living back in [place] and wants dr closer to his home. Will you accept as patient?

## 2021-07-08 ENCOUNTER — OFFICE VISIT (OUTPATIENT)
Dept: INTERNAL MEDICINE CLINIC | Age: 26
End: 2021-07-08
Payer: MEDICAID

## 2021-07-08 ENCOUNTER — DOCUMENTATION ONLY (OUTPATIENT)
Dept: INTERNAL MEDICINE CLINIC | Age: 26
End: 2021-07-08

## 2021-07-08 VITALS
RESPIRATION RATE: 16 BRPM | DIASTOLIC BLOOD PRESSURE: 78 MMHG | SYSTOLIC BLOOD PRESSURE: 125 MMHG | TEMPERATURE: 98.3 F | HEIGHT: 69 IN | OXYGEN SATURATION: 98 % | HEART RATE: 80 BPM | BODY MASS INDEX: 21.98 KG/M2 | WEIGHT: 148.4 LBS

## 2021-07-08 DIAGNOSIS — F90.0 ATTENTION DEFICIT HYPERACTIVITY DISORDER (ADHD), PREDOMINANTLY INATTENTIVE TYPE: ICD-10-CM

## 2021-07-08 DIAGNOSIS — D72.819 LEUKOPENIA, UNSPECIFIED TYPE: ICD-10-CM

## 2021-07-08 DIAGNOSIS — F41.0 PANIC ATTACK: Primary | ICD-10-CM

## 2021-07-08 PROCEDURE — 99213 OFFICE O/P EST LOW 20 MIN: CPT | Performed by: INTERNAL MEDICINE

## 2021-07-08 NOTE — PROGRESS NOTES
[address] [place] [address] and Primary Care  [place] [address]  [address]  [place] [address]  Phone:  [phone]  Fax: [phone]       Chief Complaint   Patient presents with    Panic Attack     Patient states that he has been experiencing \"small\" panic attacks over the past week. He states that he was seen at an emergency room in [place] and had blood work done, and was just advised to follow up with pcp. .      SUBJECTIVE:    Piotr Shepard is a 27 y.o. male comes in for return visit after a long absence. He has not been taking any ADHD medication for now. He comes in because he had an episode where on two occasions he became a bit nervous, anxious and sweaty. He went to the emergency room second time when this happened. A full work up was done including extensive lab work all of which was essentially negative other than mild leucopenia. He otherwise feels well. He does not feel that he is under any stress currently. He lives in [place] where he is working. His family is from the [facility], which is where he is visiting currently. He denies similar symptoms in the past.    He did receive his vaccine which is J&J approximately one month ago. Current Outpatient Medications   Medication Sig Dispense Refill    amphetamine-dextroamphetamine XR (ADDERALL XR) 30 mg XR capsule Take 1 Cap by mouth every morning.  Max Daily Amount: 30 mg. 26 Cap 0    sildenafil citrate (VIAGRA) 50 mg tablet Take 1 tablet by mouth at least 1 hour before intercourse 5 Tab 1     Past Medical History:   Diagnosis Date    ADD (attention deficit hyperactivity disorder, inattentive type)     ADHD      Past Surgical History:   Procedure Laterality Date    HX WISDOM TEETH EXTRACTION       No Known Allergies      REVIEW OF SYSTEMS:  General: negative for - chills or fever  ENT: negative for - headaches, nasal congestion or tinnitus  Respiratory: negative for - cough, hemoptysis, shortness of breath or wheezing  Cardiovascular : negative for - chest pain, edema, palpitations or shortness of breath  Gastrointestinal: negative for - abdominal pain, blood in stools, heartburn or nausea/vomiting  Genito-Urinary: no dysuria, trouble voiding, or hematuria  Musculoskeletal: negative for - gait disturbance, joint pain, joint stiffness or joint swelling  Neurological: no TIA or stroke symptoms  Hematologic: no bruises, no bleeding, no swollen glands  Integument: no lumps, mole changes, nail changes or rash  Endocrine: no malaise/lethargy or unexpected weight changes      Social History     Socioeconomic History    Marital status: SINGLE     Spouse name: Not on file    Number of children: Not on file    Years of education: Not on file    Highest education level: Not on file   Tobacco Use    Smoking status: Former Smoker     Packs/day: 0.30     Years: 2.00     Pack years: 0.60     Types: Cigarettes     Quit date: 2014     Years since quittin.1    Smokeless tobacco: Never Used   Vaping Use    Vaping Use: Never used   Substance and Sexual Activity    Alcohol use: No     Comment: occasionally     Drug use: No    Sexual activity: Yes     Partners: Female     Birth control/protection: Condom     Social Determinants of Health     Financial Resource Strain:     Difficulty of Paying Living Expenses:    Food Insecurity:     Worried About Running Out of Food in the Last Year:     Ran Out of Food in the Last Year:    Transportation Needs:     Lack of Transportation (Medical):      Lack of Transportation (Non-Medical):    Physical Activity:     Days of Exercise per Week:     Minutes of Exercise per Session:    Stress:     Feeling of Stress :    Social Connections:     Frequency of Communication with Friends and Family:     Frequency of Social Gatherings with Friends and Family:     Attends Baptism Services:     Active Member of Clubs or Organizations:     Attends Club or Organization Meetings:    [facility] Marital Status:      Family History   Problem Relation Age of Onset    Other Mother         ida    Asthma Brother        OBJECTIVE:    Visit Vitals  /78   Pulse 80   Temp 98.3 °F (36.8 °C) (Oral)   Resp 16   Ht 5' 9\" (1.753 m)   Wt 148 lb 6.4 oz (67.3 kg)   SpO2 98%   BMI 21.91 kg/m²     CONSTITUTIONAL: well , well nourished, appears age appropriate  EYES: perrla, eom intact  ENMT:moist mucous membranes, pharynx clear  NECK: supple. Thyroid normal  RESPIRATORY: Chest: clear to ascultation and percussion   CARDIOVASCULAR: Heart: regular rate and rhythm  GASTROINTESTINAL: Abdomen: soft, bowel sounds active  HEMATOLOGIC: no pathological lymph nodes palpated  MUSCULOSKELETAL: Extremities: no edema, pulse 1+   INTEGUMENT: No unusual rashes or suspicious skin lesions noted. Nails appear normal.  NEUROLOGIC: non-focal exam   MENTAL STATUS: alert and oriented, appropriate affect      ASSESSMENT:  1. Panic attack    2. Leukopenia, unspecified type    3. Attention deficit hyperactivity disorder (ADHD), predominantly inattentive type        PLAN:  1. The patient most likely had a panic attack. Observation for now. If this starts happening again, we will discuss it on her return visit. 2. He has had no need to take his Adderall for his attention deficit disorder. This is the decision that he made, and I congratulated him on this. 3. As far as the mild leukopenia is concerned, I do not think this is going to be significant. Most of it could represent as a viral process and just a normal variation at which time CBC will be repeated. Follow-up and Dispositions    · Return in about 4 weeks (around 8/5/2021).            Sudeep Sevilla MD

## 2021-07-08 NOTE — PROGRESS NOTES
Chief Complaint   Patient presents with    Panic Attack     Patient states that he has been experiencing \"small\" panic attacks over the past week. He states that he was seen at an emergency room in [place] and had blood work done, and was just advised to follow up with pcp. 1. Have you been to the ER, urgent care clinic since your last visit? Hospitalized since your last visit? Yes When: about 2 weeks ago  Where: ED in [place] Reason for visit: panic attacks    2. Have you seen or consulted any other health care providers outside of the [address] [place] [address] since your last visit? Include any pap smears or colon screening.  No

## 2021-07-10 PROBLEM — F41.0 PANIC ATTACK: Status: ACTIVE | Noted: 2021-07-10

## 2021-07-10 PROBLEM — D72.819 LEUKOPENIA: Status: ACTIVE | Noted: 2021-07-10

## 2021-08-25 ENCOUNTER — TELEPHONE (OUTPATIENT)
Dept: RHEUMATOLOGY | Age: 26
End: 2021-08-25

## 2021-08-25 ENCOUNTER — OFFICE VISIT (OUTPATIENT)
Dept: INTERNAL MEDICINE CLINIC | Age: 26
End: 2021-08-25
Payer: MEDICAID

## 2021-08-25 VITALS
OXYGEN SATURATION: 100 % | HEIGHT: 69 IN | RESPIRATION RATE: 16 BRPM | TEMPERATURE: 97.8 F | HEART RATE: 62 BPM | BODY MASS INDEX: 21.56 KG/M2 | DIASTOLIC BLOOD PRESSURE: 69 MMHG | SYSTOLIC BLOOD PRESSURE: 108 MMHG | WEIGHT: 145.6 LBS

## 2021-08-25 DIAGNOSIS — H20.9 UVEITIS: Primary | ICD-10-CM

## 2021-08-25 DIAGNOSIS — M35.9 AUTOIMMUNE DISEASE (HCC): ICD-10-CM

## 2021-08-25 PROCEDURE — 99213 OFFICE O/P EST LOW 20 MIN: CPT | Performed by: INTERNAL MEDICINE

## 2021-08-25 RX ORDER — CEPHALEXIN 500 MG/1
500 CAPSULE ORAL 3 TIMES DAILY
COMMUNITY
Start: 2021-08-22 | End: 2021-08-29

## 2021-08-25 RX ORDER — PREDNISOLONE ACETATE 10 MG/ML
1 SUSPENSION/ DROPS OPHTHALMIC
COMMUNITY

## 2021-08-25 NOTE — PROGRESS NOTES
Chief Complaint   Patient presents with    Eye Problem     dx with intermediate uveitis. was told to come see his pcp   . SUBECTIVE:    Kym Fisher is a 27 y.o. male comes in for return visit concerned because he was told that he had uveitis. He was given topical steroids for treatment and it has obviously worked. He additionally complains of floaters and it was suggested by someone that this may represent an autoimmune manifestation. It was apparently left at that and he is concerned now that he needs medication or evaluation for an autoimmune disease. He was suggested the need of an MRI of his brain, which obviously cannot be done because there is not enough information. 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 Current Outpatient Medications   Medication Sig Dispense Refill    cephALEXin (KEFLEX) 500 mg capsule Take 500 mg by mouth three (3) times daily.  prednisoLONE acetate (PRED FORTE) 1 % ophthalmic suspension Administer 1 Drop to both eyes six (6) times daily. Use until further notice      amphetamine-dextroamphetamine XR (ADDERALL XR) 30 mg XR capsule Take 1 Cap by mouth every morning. Max Daily Amount: 30 mg.  (Patient not taking: Reported on 8/25/2021) 26 Cap 0    sildenafil citrate (VIAGRA) 50 mg tablet Take 1 tablet by mouth at least 1 hour before intercourse (Patient not taking: Reported on 8/25/2021) 5 Tab 1     Past Medical History:   Diagnosis Date    ADD (attention deficit hyperactivity disorder, inattentive type)     ADHD      Past Surgical History:   Procedure Laterality Date    HX ORTHOPAEDIC      HX WISDOM TEETH EXTRACTION       No Known Allergies    REVIEW OF SYSTEMS:  Review of Systems - Negative except   ENT ROS: negative for - headaches, hearing change, nasal congestion, oral lesions, tinnitus, visual changes or   Respiratory ROS: no cough, shortness of breath, or wheezing  Cardiovascular ROS: no chest pain or dyspnea on exertion  Gastrointestinal ROS: no abdominal pain, change in bowel habits, or black or blood  Genito-Urinary ROS: no dysuria, trouble voiding, or hematuria  Musculoskeletal ROS: negative  Neurological ROS: no TIA or stroke symptoms      Social History     Socioeconomic History    Marital status: SINGLE     Spouse name: Not on file    Number of children: Not on file    Years of education: Not on file    Highest education level: Not on file   Tobacco Use    Smoking status: Former Smoker     Packs/day: 0.30     Years: 2.00     Pack years: 0.60     Types: Cigarettes     Quit date: 2014     Years since quittin.2    Smokeless tobacco: Never Used   Vaping Use    Vaping Use: Never used   Substance and Sexual Activity    Alcohol use: No     Comment: occasionally     Drug use: No    Sexual activity: Yes     Partners: Female     Birth control/protection: Condom     Social Determinants of Health     Financial Resource Strain:     Difficulty of Paying Living Expenses:    Food Insecurity:     Worried About Running Out of Food in the Last Year:     Ran Out of Food in the Last Year:    Transportation Needs:     Lack of Transportation (Medical):      Lack of Transportation (Non-Medical):    Physical Activity:     Days of Exercise per Week:     Minutes of Exercise per Session:    Stress:     Feeling of Stress :    Social Connections:     Frequency of Communication with Friends and Family:     Frequency of Social Gatherings with Friends and Family:     Attends Hindu Services:     Active Member of Clubs or Organizations:     Attends Club or Organization Meetings:     Marital Status:    r  Family History   Problem Relation Age of Onset    Other Mother         ida    Asthma Brother        OBJECTIVE:  Visit Vitals  /69   Pulse 62   Temp 97.8 °F (36.6 °C) (Oral)   Resp 16   Ht 5' 9\" (1.753 m)   Wt 66 kg (145 lb 9.6 oz)   SpO2 100% BMI 21.50 kg/m²     ENT: perrla,  eom intact  NECK: supple. Thyroid normal, no JVD  CHEST: clear to ascultation and percussion   HEART: regular rate and rhythm  ABD: soft, bowel sounds active,   EXTREMITIES: no edema, pulse 1+  INTEGUMENT: clear      ASSESSMENT:  1. Uveitis    2. Autoimmune disease ([facility] [address]        PLAN:  1. The patient will follow up with his ophthalmologist regarding his uveitis. 2. As far as an \"autoimmune disease\" I have absolutely no idea what he is referring to. I will refer him to a rheumatologist, the details of that can be worked up. 3. For me there is not much I can offer him at this point regarding these disease entities. .  Orders Placed This Encounter    AFL Qu[facility] Rheum    cephALEXin (KEFLEX) 500 mg capsule    prednisoLONE acetate (PRED FORTE) 1 % ophthalmic suspension       Follow-up and Dispositions    · Return if symptoms worsen or fail to improve.            Jeremy Chanel MD

## 2021-08-25 NOTE — PROGRESS NOTES
1. Have you been to the ER, urgent care clinic since your last visit? Hospitalized since your last visit? Yes When: bloodwork 8/16/21, [address]    2. Have you seen or consulted any other health care providers outside of the [address] [place] [address] since your last visit? Include any pap smears or colon screening. No   Chief Complaint   Patient presents with    Eye Problem     dx with intermediate uveitis.  was told to come see his pcp     Visit Vitals  /69   Pulse 62   Temp 97.8 °F (36.6 °C) (Oral)   Resp 16   Ht 5' 9\" (1.753 m)   Wt 145 lb 9.6 oz (66 kg)   SpO2 100%   BMI 21.50 kg/m²

## 2022-03-19 PROBLEM — L03.116 CELLULITIS OF LEFT LOWER EXTREMITY: Status: ACTIVE | Noted: 2020-03-24

## 2022-03-19 PROBLEM — F41.0 PANIC ATTACK: Status: ACTIVE | Noted: 2021-07-10

## 2022-03-20 PROBLEM — D72.819 LEUKOPENIA: Status: ACTIVE | Noted: 2021-07-10

## 2023-05-17 RX ORDER — SILDENAFIL 50 MG/1
TABLET, FILM COATED ORAL
COMMUNITY
Start: 2019-11-12

## 2023-05-17 RX ORDER — DEXTROAMPHETAMINE SACCHARATE, AMPHETAMINE ASPARTATE MONOHYDRATE, DEXTROAMPHETAMINE SULFATE AND AMPHETAMINE SULFATE 7.5; 7.5; 7.5; 7.5 MG/1; MG/1; MG/1; MG/1
30 CAPSULE, EXTENDED RELEASE ORAL
COMMUNITY
Start: 2021-04-15

## 2023-05-17 RX ORDER — PREDNISOLONE ACETATE 10 MG/ML
1 SUSPENSION/ DROPS OPHTHALMIC
COMMUNITY